# Patient Record
Sex: MALE | Race: BLACK OR AFRICAN AMERICAN | NOT HISPANIC OR LATINO | Employment: UNEMPLOYED | URBAN - METROPOLITAN AREA
[De-identification: names, ages, dates, MRNs, and addresses within clinical notes are randomized per-mention and may not be internally consistent; named-entity substitution may affect disease eponyms.]

---

## 2017-05-26 ENCOUNTER — HOSPITAL ENCOUNTER (INPATIENT)
Facility: HOSPITAL | Age: 22
LOS: 6 days | Discharge: HOME | DRG: 885 | End: 2017-06-01
Attending: PSYCHIATRY & NEUROLOGY | Admitting: PSYCHIATRY & NEUROLOGY
Payer: MEDICAID

## 2017-05-26 DIAGNOSIS — F63.81 INTERMITTENT EXPLOSIVE DISORDER: ICD-10-CM

## 2017-05-26 DIAGNOSIS — D50.9 MICROCYTIC ANEMIA: ICD-10-CM

## 2017-05-26 DIAGNOSIS — R45.850 HOMICIDAL THOUGHTS: ICD-10-CM

## 2017-05-26 DIAGNOSIS — F39 MOOD DISORDER: Primary | ICD-10-CM

## 2017-05-26 DIAGNOSIS — F17.210 CIGARETTE NICOTINE DEPENDENCE WITHOUT COMPLICATION: ICD-10-CM

## 2017-05-26 LAB
25(OH)D3+25(OH)D2 SERPL-MCNC: 20 NG/ML
CHOLEST/HDLC SERPL: 4.2 {RATIO}
FERRITIN SERPL-MCNC: 53 NG/ML
FOLATE SERPL-MCNC: 11.6 NG/ML
HDL/CHOLESTEROL RATIO: 23.9 %
HDLC SERPL-MCNC: 176 MG/DL
HDLC SERPL-MCNC: 42 MG/DL
IRON SERPL-MCNC: 41 UG/DL
LDLC SERPL CALC-MCNC: 114.8 MG/DL
NONHDLC SERPL-MCNC: 134 MG/DL
RPR SER QL: NORMAL
SATURATED IRON: 13 %
TOTAL IRON BINDING CAPACITY: 315 UG/DL
TRANSFERRIN SERPL-MCNC: 213 MG/DL
TRIGL SERPL-MCNC: 96 MG/DL
VIT B12 SERPL-MCNC: 791 PG/ML

## 2017-05-26 PROCEDURE — 82728 ASSAY OF FERRITIN: CPT

## 2017-05-26 PROCEDURE — 11400000 HC PSYCH PRIVATE ROOM

## 2017-05-26 PROCEDURE — 82746 ASSAY OF FOLIC ACID SERUM: CPT

## 2017-05-26 PROCEDURE — 84466 ASSAY OF TRANSFERRIN: CPT

## 2017-05-26 PROCEDURE — 82607 VITAMIN B-12: CPT

## 2017-05-26 PROCEDURE — 99223 1ST HOSP IP/OBS HIGH 75: CPT | Mod: AF,HB,, | Performed by: PSYCHIATRY & NEUROLOGY

## 2017-05-26 PROCEDURE — 82306 VITAMIN D 25 HYDROXY: CPT

## 2017-05-26 PROCEDURE — 97802 MEDICAL NUTRITION INDIV IN: CPT

## 2017-05-26 PROCEDURE — 83540 ASSAY OF IRON: CPT

## 2017-05-26 PROCEDURE — 80061 LIPID PANEL: CPT

## 2017-05-26 PROCEDURE — 90833 PSYTX W PT W E/M 30 MIN: CPT | Mod: AF,HB,, | Performed by: PSYCHIATRY & NEUROLOGY

## 2017-05-26 PROCEDURE — 99232 SBSQ HOSP IP/OBS MODERATE 35: CPT | Mod: ,,, | Performed by: NURSE PRACTITIONER

## 2017-05-26 PROCEDURE — 25000003 PHARM REV CODE 250: Performed by: PSYCHIATRY & NEUROLOGY

## 2017-05-26 PROCEDURE — 36415 COLL VENOUS BLD VENIPUNCTURE: CPT

## 2017-05-26 PROCEDURE — 27000339 *HC DAILY SUPPLY KIT

## 2017-05-26 PROCEDURE — 86592 SYPHILIS TEST NON-TREP QUAL: CPT

## 2017-05-26 RX ORDER — DIVALPROEX SODIUM 500 MG/1
500 TABLET, DELAYED RELEASE ORAL 2 TIMES DAILY
Status: DISCONTINUED | OUTPATIENT
Start: 2017-05-26 | End: 2017-06-01 | Stop reason: HOSPADM

## 2017-05-26 RX ORDER — ACETAMINOPHEN 325 MG/1
650 TABLET ORAL EVERY 6 HOURS PRN
Status: DISCONTINUED | OUTPATIENT
Start: 2017-05-26 | End: 2017-06-01 | Stop reason: HOSPADM

## 2017-05-26 RX ORDER — QUETIAPINE FUMARATE 25 MG/1
50 TABLET, FILM COATED ORAL NIGHTLY
Status: DISCONTINUED | OUTPATIENT
Start: 2017-05-26 | End: 2017-05-28

## 2017-05-26 RX ORDER — OLANZAPINE 10 MG/2ML
10 INJECTION, POWDER, FOR SOLUTION INTRAMUSCULAR EVERY 4 HOURS PRN
Status: DISCONTINUED | OUTPATIENT
Start: 2017-05-26 | End: 2017-06-01 | Stop reason: HOSPADM

## 2017-05-26 RX ORDER — IBUPROFEN 200 MG
1 TABLET ORAL DAILY PRN
Status: DISCONTINUED | OUTPATIENT
Start: 2017-05-26 | End: 2017-06-01 | Stop reason: HOSPADM

## 2017-05-26 RX ORDER — HYDROXYZINE PAMOATE 50 MG/1
50 CAPSULE ORAL EVERY 6 HOURS PRN
Status: DISCONTINUED | OUTPATIENT
Start: 2017-05-26 | End: 2017-06-01 | Stop reason: HOSPADM

## 2017-05-26 RX ORDER — OLANZAPINE 10 MG/1
10 TABLET ORAL EVERY 4 HOURS PRN
Status: DISCONTINUED | OUTPATIENT
Start: 2017-05-26 | End: 2017-06-01 | Stop reason: HOSPADM

## 2017-05-26 RX ORDER — MAG HYDROX/ALUMINUM HYD/SIMETH 200-200-20
30 SUSPENSION, ORAL (FINAL DOSE FORM) ORAL EVERY 6 HOURS PRN
Status: DISCONTINUED | OUTPATIENT
Start: 2017-05-26 | End: 2017-06-01 | Stop reason: HOSPADM

## 2017-05-26 RX ORDER — LOPERAMIDE HYDROCHLORIDE 2 MG/1
2 CAPSULE ORAL
Status: DISCONTINUED | OUTPATIENT
Start: 2017-05-26 | End: 2017-06-01 | Stop reason: HOSPADM

## 2017-05-26 RX ORDER — DOCUSATE SODIUM 100 MG/1
100 CAPSULE, LIQUID FILLED ORAL DAILY PRN
Status: DISCONTINUED | OUTPATIENT
Start: 2017-05-26 | End: 2017-06-01 | Stop reason: HOSPADM

## 2017-05-26 RX ADMIN — DIVALPROEX SODIUM 500 MG: 500 TABLET, DELAYED RELEASE ORAL at 09:05

## 2017-05-26 RX ADMIN — QUETIAPINE FUMARATE 50 MG: 25 TABLET, FILM COATED ORAL at 09:05

## 2017-05-26 RX ADMIN — THERA TABS 1 TABLET: TAB at 11:05

## 2017-05-26 RX ADMIN — HYDROXYZINE PAMOATE 50 MG: 50 CAPSULE ORAL at 10:05

## 2017-05-26 RX ADMIN — DIVALPROEX SODIUM 500 MG: 500 TABLET, DELAYED RELEASE ORAL at 11:05

## 2017-05-26 RX ADMIN — OLANZAPINE 10 MG: 10 TABLET, FILM COATED ORAL at 11:05

## 2017-05-26 NOTE — PLAN OF CARE
Problem: Patient Care Overview (Adult)  Goal: Plan of Care Review  Outcome: Ongoing (interventions implemented as appropriate)  Patient anxious and pacing most of shift. Cooperative and redirectable. Slow to comprehend and hard to understand due to stuttering. Staring often at peers and staff, mistrustful. Appetite good. Medication compliant. Hopeful to get into a group home upon discharge. Safety precautions maintained.
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
## 2017-05-26 NOTE — CONSULTS
[facility]  Adult Nutrition  Consult Note    SUMMARY     Recommendations    Recommendation/Intervention: 1. Cont w/ current diet order; order daily MVI  2. RD to cont to monitor all nutr parameters  Goals: Maintain meal intake >/=75%  Nutrition Goal Status: new       Reason for Assessment    Reason for Assessment: physician consult  Diagnosis: other (see comments) (20 yo male admitted w/ HI)  Relevent Medical History: ADHD, mood disorder, seizures         General Information Comments: Pt likely well-nourished pta; no nutr concerns at this time    Nutrition Discharge Planning: Home on Regular diet w/ adequate meal intake    Nutrition Prescription Ordered    Current Diet Order: Regular  Nutrition Order Comments: no meal intake documented as of yet           Nutrition Risk Screen     Nutrition Risk Screen: no indicators present    Nutrition/Diet History       Typical Food/Fluid Intake: Likely adequate pta  Food Preferences: JACKIE        Factors Affecting Nutritional Intake: other (see comments) (none @ this time)        Labs/Tests/Procedures/Meds       Pertinent Labs Reviewed: reviewed  Pertinent Labs Comments: CMP wnl  Pertinent Medications Reviewed: reviewed  Pertinent Medications Comments: only prn meds ordered thus far    Physical Findings    Overall Physical Appearance: obese        Skin: intact    Anthropometrics       Height (inches): 69.02 in     Weight (kg): 113.4 kg  Ideal Body Weight (IBW), Male: 160.12 lb     % Ideal Body Weight, Male (lb): 156.13 lb     BMI (kg/m2): 36.9  BMI Grade: 35 - 39.9 - obesity - grade II           Estimated/Assessed Needs    Weight Used For Calorie Calculations: 113.4 kg (250 lb)   Height (cm): 175.3 cm     Energy Need Method: [place]-St Jeor (= 2126 cals daily)      RMR (Otsego-St. Jeor Equation): 2125.25        Weight Used For Protein Calculations: 113.4 kg (250 lb)  Protein Requirements: 91 gms (.8 gms/kg)    Fluid Need Method: RDA Method (1ml/kcal or per  MD)            Assessment and Plan  No nutr dx @ this time.      Monitor and Evaluation    Food and Nutrient Intake: energy intake, food and beverage intake  Food and Nutrient Adminstration: diet order     Physical Activity and Function: nutrition-related ADLs and IADLs  Anthropometric Measurements: weight, weight change  Biochemical Data, Medical Tests and Procedures: electrolyte and renal panel, glucose/endocrine profile  Nutrition-Focused Physical Findings: overall appearance    Nutrition Risk    Level of Risk: other (see comments) (F/u 1 x weekly)    Nutrition Follow-Up    RD Follow-up?: Yes

## 2017-05-26 NOTE — PSYCH
"    Chief Complaint:  Patient states he had an argument with his cousin. "Yisela always with that mess, Another cousin brought me to her house ."   States there's a party today by keturah        Pt Age/Gender/Appearance/Psych History/Symptoms and Duration:  Patient is a 20yo male with a past psychiatric history of mood disorder, currently admitted to the inpatient unit with the following chief complaint: homicidal ideations    Suicidal Ideations/Plan/Attempt History/Risk and Protective Factors:  None    Substance Abuse History/UTOX:    Patient states he used to do drugs but is unclear what drugs.  States he does drug by himself, when asked where he got the drugs he states I don't know, I just did it. States he wants rehab.    Patient had a negative UTOX       Sleep/Appetite Quality:  When I get mad I dont eat       Compliance/Legal History/Issues:  None     Abuse Concerns:  None    Cultural/Sabianism Values/Beliefs:  none      Supports/Marital Status/Quality of Interpersonal Relationships:  Cousin       Initial Discharge Plan:      Possible group home in [facility]                                  "

## 2017-05-26 NOTE — HOSPITAL COURSE
Admitted to [facility]. Reports he just gets angry a lot. Feels like his body is breaking down on him.
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
## 2017-05-26 NOTE — HPI
"This is a 21 y.o. male with a Hx of mood disorder, who presents for psychiatric evaluation for social stressors. Pt states he received a phone call from his cousin stating that he was included in "some mess". The Pt notes that he is concerned about his well being being because he "does not have time for all that fighting". Pt denies any SI, HI, or audio/visual hallucinations. He admits to being compliant with his psychiatric medications. Pt has no other symptomatic complaints at time.   "

## 2017-05-26 NOTE — PLAN OF CARE
Problem: Patient Care Overview (Adult)  Goal: Plan of Care Review  Recommendations     Recommendation/Intervention: 1. Cont w/ current diet order; order daily MVI  2. RD to cont to monitor all nutr parameters  Goals: Maintain meal intake >/=75%  Nutrition Goal Status: new

## 2017-05-26 NOTE — SUBJECTIVE & OBJECTIVE
Past Medical History:   Diagnosis Date    ADHD (attention deficit hyperactivity disorder)     Bipolar disorder     History of psychiatric hospitalization     Hx of psychiatric care     Romi     Mood disorder     Psychiatric problem     Seizures     Therapy        No past surgical history on file.    Review of patient's allergies indicates:  No Known Allergies    No current facility-administered medications on file prior to encounter.      Current Outpatient Prescriptions on File Prior to Encounter   Medication Sig    quetiapine (SEROQUEL) 100 MG Tab Take by mouth.    divalproex (DEPAKOTE) 125 MG EC tablet Take 125 mg by mouth 3 (three) times daily.    permethrin (ELIMITE) 5 % cream Apply to affected area once     Family History     None        Social History Main Topics    Smoking status: Current Every Day Smoker     Packs/day: 1.00     Types: Cigarettes    Smokeless tobacco: Not on file      Comment: handout given and reviewed with pt, will have consult for smoking cessation    Alcohol use No    Drug use: No    Sexual activity: Not on file     Review of Systems   Constitutional: Negative for chills, diaphoresis, fatigue and fever.   Eyes: Negative for visual disturbance.   Respiratory: Negative for cough, shortness of breath and wheezing.    Cardiovascular: Negative for chest pain.   Gastrointestinal: Negative for abdominal distention, abdominal pain, constipation, diarrhea, nausea and vomiting.   Musculoskeletal: Negative for arthralgias, back pain and myalgias.   Neurological: Negative for headaches.   Psychiatric/Behavioral: Negative for agitation, dysphoric mood, self-injury, sleep disturbance and suicidal ideas. The patient is not nervous/anxious and is not hyperactive.         + anger issues     Objective:     Vital Signs (Most Recent):  Temp: (!) 95.7 °F (35.4 °C) (05/26/17 0800)  Pulse: 64 (05/26/17 0800)  Resp: 16 (05/26/17 0800)  BP: (!) 124/58 (05/26/17 0800) Vital Signs (24h  Range):  Temp:  [95.7 °F (35.4 °C)-98.2 °F (36.8 °C)] 95.7 °F (35.4 °C)  Pulse:  [64-77] 64  Resp:  [14-16] 16  SpO2:  [99 %] 99 %  BP: (124-158)/(58-70) 124/58     Weight: 113.4 kg (250 lb)  Body mass index is 36.92 kg/m².    Physical Exam   Constitutional: He is oriented to person, place, and time. He appears well-developed and well-nourished.   HENT:   Head: Normocephalic and atraumatic.   Right Ear: External ear normal.   Left Ear: External ear normal.   Nose: Nose normal.   Mouth/Throat: Oropharynx is clear and moist. No oropharyngeal exudate.   Cardiovascular: Normal rate, regular rhythm and normal heart sounds.    Pulmonary/Chest: Effort normal and breath sounds normal.   Abdominal: Soft. Bowel sounds are normal.   Musculoskeletal: He exhibits no edema.   Neurological: He is alert and oriented to person, place, and time. No cranial nerve deficit.   Neuro: Cranial nerves:  CN II Visual fields full to confrontation.   CN III, IV, VI Pupils are equal, round, and reactive to light.  CN III: no palsy  Nystagmus: none   Diplopia: none  Ophthalmoparesis: none  CN V Facial sensation intact.    CN VII Facial expression full, symmetric.    CN VIII normal.    CN IX normal.   CN X normal.   CN XI normal.   CN XII normal.       Skin: Skin is warm and dry. No pallor.   Psychiatric: He has a normal mood and affect. His behavior is normal. Judgment and thought content normal.   Nursing note and vitals reviewed.    Significant Labs:   CBC:   Recent Labs  Lab 05/26/17  0002   WBC 6.52   HGB 12.6*   HCT 39.5*        CMP:   Recent Labs  Lab 05/26/17  0002      K 3.8      CO2 26   GLU 97   BUN 15   CREATININE 1.2   CALCIUM 9.1   PROT 7.5   ALBUMIN 3.8   BILITOT 0.5   ALKPHOS 58   AST 27   ALT 24   ANIONGAP 9   EGFRNONAA >60.0     Lipid Panel:   Recent Labs  Lab 05/26/17  0557   CHOL 176   HDL 42   LDLCALC 114.8   TRIG 96   CHOLHDL 23.9     TSH:   Recent Labs  Lab 05/26/17  0002   TSH 3.170     Urine Studies:    Recent Labs  Lab 05/26/17  0102   COLORU Yellow   APPEARANCEUA Clear   PHUR 5.0   SPECGRAV 1.030   PROTEINUA 1+*   GLUCUA Negative   KETONESU Trace*   BILIRUBINUA Negative   OCCULTUA Negative   NITRITE Negative   UROBILINOGEN 4.0-6.0*   LEUKOCYTESUR Negative   RBCUA 1   WBCUA 1   BACTERIA None   SQUAMEPITHEL 0   HYALINECASTS 0       Significant Imaging: none

## 2017-05-26 NOTE — CONSULTS
"[facility] Hospital Medicine  Consult Note    Patient Name: Rosalinda Terrell  MRN: [id number]  Admission Date: 5/26/2017  Hospital Length of Stay: 0 days  Attending Physician: Kameron Freeman MD   Primary Care Provider: Primary Doctor No           Patient information was obtained from patient and ER records.     Consults  Subjective:     Principal Problem: Mood disorder    Chief Complaint: No chief complaint on file.       HPI: This is a 21 y.o. male with a Hx of mood disorder, who presents for psychiatric evaluation for social stressors. Pt states he received a phone call from his cousin stating that he was included in "some mess". The Pt notes that he is concerned about his well being being because he "does not have time for all that fighting". Pt denies any SI, HI, or audio/visual hallucinations. He admits to being compliant with his psychiatric medications. Pt has no other symptomatic complaints at time.     Past Medical History:   Diagnosis Date    ADHD (attention deficit hyperactivity disorder)     Bipolar disorder     History of psychiatric hospitalization     Hx of psychiatric care     Romi     Mood disorder     Psychiatric problem     Seizures     Therapy        No past surgical history on file.    Review of patient's allergies indicates:  No Known Allergies    No current facility-administered medications on file prior to encounter.      Current Outpatient Prescriptions on File Prior to Encounter   Medication Sig    quetiapine (SEROQUEL) 100 MG Tab Take by mouth.    divalproex (DEPAKOTE) 125 MG EC tablet Take 125 mg by mouth 3 (three) times daily.    permethrin (ELIMITE) 5 % cream Apply to affected area once     Family History     None        Social History Main Topics    Smoking status: Current Every Day Smoker     Packs/day: 1.00     Types: Cigarettes    Smokeless tobacco: Not on file      Comment: handout given and reviewed with pt, will have consult for smoking " cessation    Alcohol use No    Drug use: No    Sexual activity: Not on file     Review of Systems   Constitutional: Negative for chills, diaphoresis, fatigue and fever.   Eyes: Negative for visual disturbance.   Respiratory: Negative for cough, shortness of breath and wheezing.    Cardiovascular: Negative for chest pain.   Gastrointestinal: Negative for abdominal distention, abdominal pain, constipation, diarrhea, nausea and vomiting.   Musculoskeletal: Negative for arthralgias, back pain and myalgias.   Neurological: Negative for headaches.   Psychiatric/Behavioral: Negative for agitation, dysphoric mood, self-injury, sleep disturbance and suicidal ideas. The patient is not nervous/anxious and is not hyperactive.         + anger issues     Objective:     Vital Signs (Most Recent):  Temp: (!) 95.7 °F (35.4 °C) (05/26/17 0800)  Pulse: 64 (05/26/17 0800)  Resp: 16 (05/26/17 0800)  BP: (!) 124/58 (05/26/17 0800) Vital Signs (24h Range):  Temp:  [95.7 °F (35.4 °C)-98.2 °F (36.8 °C)] 95.7 °F (35.4 °C)  Pulse:  [64-77] 64  Resp:  [14-16] 16  SpO2:  [99 %] 99 %  BP: (124-158)/(58-70) 124/58     Weight: 113.4 kg (250 lb)  Body mass index is 36.92 kg/m².    Physical Exam   Constitutional: He is oriented to person, place, and time. He appears well-developed and well-nourished.   HENT:   Head: Normocephalic and atraumatic.   Right Ear: External ear normal.   Left Ear: External ear normal.   Nose: Nose normal.   Mouth/Throat: Oropharynx is clear and moist. No oropharyngeal exudate.   Cardiovascular: Normal rate, regular rhythm and normal heart sounds.    Pulmonary/Chest: Effort normal and breath sounds normal.   Abdominal: Soft. Bowel sounds are normal.   Musculoskeletal: He exhibits no edema.   Neurological: He is alert and oriented to person, place, and time. No cranial nerve deficit.   Neuro: Cranial nerves:  CN II Visual fields full to confrontation.   CN III, IV, VI Pupils are equal, round, and reactive to light.  CN  III: no palsy  Nystagmus: none   Diplopia: none  Ophthalmoparesis: none  CN V Facial sensation intact.    CN VII Facial expression full, symmetric.    CN VIII normal.    CN IX normal.   CN X normal.   CN XI normal.   CN XII normal.       Skin: Skin is warm and dry. No pallor.   Psychiatric: He has a normal mood and affect. His behavior is normal. Judgment and thought content normal.   Nursing note and vitals reviewed.    Significant Labs:   CBC:   Recent Labs  Lab 05/26/17  0002   WBC 6.52   HGB 12.6*   HCT 39.5*        CMP:   Recent Labs  Lab 05/26/17  0002      K 3.8      CO2 26   GLU 97   BUN 15   CREATININE 1.2   CALCIUM 9.1   PROT 7.5   ALBUMIN 3.8   BILITOT 0.5   ALKPHOS 58   AST 27   ALT 24   ANIONGAP 9   EGFRNONAA >60.0     Lipid Panel:   Recent Labs  Lab 05/26/17  0557   CHOL 176   HDL 42   LDLCALC 114.8   TRIG 96   CHOLHDL 23.9     TSH:   Recent Labs  Lab 05/26/17  0002   TSH 3.170     Urine Studies:   Recent Labs  Lab 05/26/17  0102   COLORU Yellow   APPEARANCEUA Clear   PHUR 5.0   SPECGRAV 1.030   PROTEINUA 1+*   GLUCUA Negative   KETONESU Trace*   BILIRUBINUA Negative   OCCULTUA Negative   NITRITE Negative   UROBILINOGEN 4.0-6.0*   LEUKOCYTESUR Negative   RBCUA 1   WBCUA 1   BACTERIA None   SQUAMEPITHEL 0   HYALINECASTS 0       Significant Imaging: none    Assessment/Plan:     Microcytic anemia    Anemia w/up ordered per Dr. Freeman.           Cigarette nicotine dependence without complication              Intermittent explosive disorder              Homicidal thoughts              * Mood disorder                VTE Risk Mitigation     None        Thank you for your consult. I will sign off. Please contact us if you have any additional questions.    Isabel Crawford NP  Department of Hospital Medicine   [facility]

## 2017-05-27 PROCEDURE — 11400000 HC PSYCH PRIVATE ROOM

## 2017-05-27 PROCEDURE — 99231 SBSQ HOSP IP/OBS SF/LOW 25: CPT | Mod: HB,AF,S$PBB, | Performed by: PSYCHIATRY & NEUROLOGY

## 2017-05-27 PROCEDURE — 25000003 PHARM REV CODE 250: Performed by: PSYCHIATRY & NEUROLOGY

## 2017-05-27 PROCEDURE — 27000339 *HC DAILY SUPPLY KIT

## 2017-05-27 RX ADMIN — ACETAMINOPHEN 650 MG: 325 TABLET, FILM COATED ORAL at 08:05

## 2017-05-27 RX ADMIN — DIVALPROEX SODIUM 500 MG: 500 TABLET, DELAYED RELEASE ORAL at 08:05

## 2017-05-27 RX ADMIN — QUETIAPINE FUMARATE 50 MG: 25 TABLET, FILM COATED ORAL at 08:05

## 2017-05-27 RX ADMIN — THERA TABS 1 TABLET: TAB at 09:05

## 2017-05-27 RX ADMIN — DIVALPROEX SODIUM 500 MG: 500 TABLET, DELAYED RELEASE ORAL at 09:05

## 2017-05-27 NOTE — PLAN OF CARE
Problem: Patient Care Overview (Adult)  Goal: Plan of Care Review  Outcome: Ongoing (interventions implemented as appropriate)  Shift note : patient is in the day room with staff and peers . He is interacting with them. He is coloring pictures and taking all ordered medications , he is eating all meals .

## 2017-05-27 NOTE — PLAN OF CARE
Problem: Patient Care Overview (Adult)  Goal: Individualization & Mutuality  Patient calm and cooperative with staff.  Out of room for PM snack and took medication with no problem.  He denies SI and HI or plan, but would not express feelings.

## 2017-05-27 NOTE — PROGRESS NOTES
"PSYCHIATRY DAILY INPATIENT PROGRESS NOTE  SUBSEQUENT HOSPITAL VISIT    ENCOUNTER DATE: 5/27/2017  SITE: [facility]    DATE OF ADMISSION: 5/26/2017  3:58 AM  LENGTH OF STAY: 1 days      HISTORY    CHIEF COMPLAINT   Rosalinda Terrell is a 21 y.o. male, seen during daily koroma rounds on the inpatient unit.  Rosalinda Terrell presents with the chief complaint of homicidal ideations.    HPI   (Elements: Location, Quality, Severity, Duration, Timing, Content, Modifying Factors, Associated Signs & Symptoms)    The patient was seen and examined. The chart was reviewed.    Staff reports no behavioral or management issues. Pacing on the unit yesterday with anxious affect.    The patient has been compliant with treatment. The patient denied any side effects.     On my interview, patient is calm and cooperative.  Poverty of speech, limited engagement in assessment.  Remains a poor historian, and I had difficulty following patient's thought process.  He perseverates on going to a "group home" on discharge.  Reports mood is "good" and that he's "anxious to be better" and "go to a group home."  Reports that he's getting along with everyone on the unit.    Denies psychiatric symptoms as below:  Denied Symptoms of Depression: no diminished mood or loss of interest/anhedonia; no irritability, diminished energy, change in sleep, change in appetite, diminished concentration or cognition or indecisiveness, PMA/R, excessive guilt or hopelessness or worthlessness, or suicidal ideations     Denied changes in Sleep: no trouble with initiation, maintenanc, or early morning awakening with inability to return to sleep     Denied Suicidal/Homicidal ideations: no active/passive ideations, organized plans,or  future intentions     Denied Symptoms of psychosis: no hallucinations, delusions, disorganized thinking, disorganized behavior or abnormal motor behavior, or negative symptoms     Denied Symptoms of temitope or hypomania: no " elevated, expansive, or irritable mood with no increased energy or activity; with no inflated self-esteem or grandiosity, decreased need for sleep, increased rate of speech, FOI or racing thoughts, distractibility, increased goal directed activity or PMA, or risky/disinhibited behavior     Denied Symptoms of HUGO: no excessive anxiety/worry/fear; with no restlessness, fatigue, poor concentration, irritability, muscle tension, or sleep disturbance     Denied Symptoms of Panic Disorder: no recurrent panic attacks; without agoraphobia     Denied Symptoms of PTSD: no h/o trauma; no re-experiencing/intrusive symptoms, avoidant behavior, negative alterations in cognition or mood, or hyperarousal symptoms; without dissociative symptoms      Denied Symptoms of OCD: no obsessions or compulsions      Denied Symptoms of Eating Disorders: no anorexia, bulimia or binging     Denied Substance Use: no intoxication, withdrawal, tolerance, used in larger amounts or duration than intended, unsuccessful attempts to limit or quit, increased time engaging in or seeking out, cravings or strong desire to use, failure to fulfill obligations, negative consequences in social/interpersonal/occupational,/recreational areas, use in dangerous situations, or medical or psychological consequences       ROS  General ROS: negative  Ophthalmic ROS: negative  ENT ROS: negative  Allergy and Immunology ROS: negative  Hematological and Lymphatic ROS: negative  Endocrine ROS: negative  Respiratory ROS: no cough, shortness of breath, or wheezing  Cardiovascular ROS: no chest pain or dyspnea on exertion  Gastrointestinal ROS: no abdominal pain, change in bowel habits, or black or bloody stools  Genito-Urinary ROS: no dysuria, trouble voiding, or hematuria  Musculoskeletal ROS: negative  Neurological ROS: no TIA or stroke symptoms  Dermatological ROS: negative    PAST MEDICAL HISTORY   Past Medical History:   Diagnosis Date    ADHD (attention deficit  "hyperactivity disorder)     Bipolar disorder     History of psychiatric hospitalization     Hx of psychiatric care     Romi     Mood disorder     Psychiatric problem     Seizures     Therapy            PSYCHOTROPIC MEDICATIONS   Scheduled Meds:   divalproex  500 mg Oral BID    multivitamin  1 tablet Oral Daily    quetiapine  50 mg Oral QHS     Continuous Infusions:   PRN Meds:.acetaminophen, aluminum-magnesium hydroxide-simethicone, docusate sodium, hydrOXYzine pamoate, loperamide, nicotine, olanzapine **AND** olanzapine, pneumoc 13-donnell conj-dip cr(PF)        EXAMINATION    VITALS   Vitals:    05/27/17 0800   BP: 130/84   Pulse: 62   Resp: 18   Temp: (!) 95.7 °F (35.4 °C)       CONSTITUTIONAL  General Appearance: stated age, casual dress    NEUROLOGIC  Abnormal Involuntary Movements: none noted  Gait and Station: normal gate    PSYCHIATRIC   Level of Consciousness: alert, awake  Orientation: grossly oriented  Grooming: appropriate for setting  Psychomotor Behavior: no PMR/PMA  Speech: mumbled at times  Mood: "anxious and good"  Affect: congruent, somewhat constricted  Thought Process: somewhat tangential  Thought Content: no SI/HI, no AH/VH  Memory: grossly intact but limited, likely by cognition  Attention: intact to conversation  Estimate if Intelligence:  Below to low-average based on work/education history, vocabulary and mental status exam  Insight: limited- limited understanding of anger issues and tx needs/plan  Judgment: fair- no bx issues, compliant and cooperative; reports of recent violnece        DIAGNOSTIC TESTING   Laboratory Results  No results found for this or any previous visit (from the past 24 hour(s)).      MEDICAL DECISION MAKING    DIAGNOSES  Unspecified mood Disorder  Intermittent Explosive Disorder     Nicotine Dependence      Microcytic anemia  H/o seizure disorder    PROBLEM LIST AND MANAGEMENT PLANS  Mood  IED  Nicotine use  Anemia  H/o seizure disorder    PRESCRIPTION DRUG " MANAGEMENT  Compliance: yes  Side Effects: no  Regimen Adjustments:   Depakote 500 mg po BID for mood stabilization, off-label IED, and seizure ppx,   Seroquel at 50 mg po q HS for adjunctive mood disorder     Nicotine patch daily for nicotine cessation; patient counseled         DISCHARGE PLANNING  -SW to assist with aftercare planning and collateral  -Once stable, discharge to group home with outpatient follow up care and/or rehab  -Continue inpatient treatment under a PEC and/or CEC for danger to others as evidenced by HI in context of impulsivity, problems with mood, and suspected cognitive impairment.  Patient with limited insight and poor judgement.    NEED FOR CONTINUED HOSPITALIZATION  Psychiatric illness continues to pose a potential threat to life or bodily function, of self or others, thereby requiring the need for continued inpatient psychiatric hospitalization: Yes    Protective inpatient pyschiatric hospitalization required while a safe disposition plan is enacted: Yes    Patient stabilized and ready for discharge from inpatient psychiatric unit: No      STAFF:   Lupillo Angela MD  Psychiatry

## 2017-05-27 NOTE — NURSING
Patient resting quietly in bed with eyes closed.  Respirations easy and unlabored appears asleep.  Slept well all shift.  Safety maintained with rounds every 15 minutes. Bed at lowest position.  Path ways kept clear.  No fall occured .

## 2017-05-28 PROCEDURE — 27000339 *HC DAILY SUPPLY KIT

## 2017-05-28 PROCEDURE — 25000003 PHARM REV CODE 250: Performed by: PSYCHIATRY & NEUROLOGY

## 2017-05-28 PROCEDURE — 11400000 HC PSYCH PRIVATE ROOM

## 2017-05-28 PROCEDURE — 99231 SBSQ HOSP IP/OBS SF/LOW 25: CPT | Mod: HB,AF,S$PBB, | Performed by: PSYCHIATRY & NEUROLOGY

## 2017-05-28 RX ORDER — QUETIAPINE FUMARATE 100 MG/1
100 TABLET, FILM COATED ORAL NIGHTLY
Status: DISCONTINUED | OUTPATIENT
Start: 2017-05-28 | End: 2017-06-01 | Stop reason: HOSPADM

## 2017-05-28 RX ADMIN — QUETIAPINE FUMARATE 100 MG: 100 TABLET, FILM COATED ORAL at 09:05

## 2017-05-28 RX ADMIN — THERA TABS 1 TABLET: TAB at 08:05

## 2017-05-28 RX ADMIN — DIVALPROEX SODIUM 500 MG: 500 TABLET, DELAYED RELEASE ORAL at 09:05

## 2017-05-28 RX ADMIN — DIVALPROEX SODIUM 500 MG: 500 TABLET, DELAYED RELEASE ORAL at 08:05

## 2017-05-28 NOTE — PLAN OF CARE
Problem: Patient Care Overview (Adult)  Goal: Individualization & Mutuality  Patient calm and cooperative with staff.  He took PM medications with no problem.  Tylenol was given for c/o right thumb pain.  He stated that he believed the pain is from coloring too much, but he will still color because he likes it.  Rosalinda admits to being a little depressed but not SI and HI or plan.

## 2017-05-28 NOTE — PLAN OF CARE
Problem: Patient Care Overview (Adult)  Goal: Plan of Care Review  Outcome: Ongoing (interventions implemented as appropriate)  Shift note : patient is in the day room with staff and peers . He states that his goal is to be on his medications so he will stop hanging with the wrong crowd and not go back to residential . He is in pleasant mood and is talking with peers and staff . He is taking all ordered medications and eating all meals .

## 2017-05-29 LAB
ALBUMIN SERPL BCP-MCNC: 3.2 G/DL
ALP SERPL-CCNC: 48 U/L
ALT SERPL W/O P-5'-P-CCNC: 18 U/L
ANION GAP SERPL CALC-SCNC: 9 MMOL/L
AST SERPL-CCNC: 15 U/L
BILIRUB SERPL-MCNC: 0.2 MG/DL
BUN SERPL-MCNC: 8 MG/DL
CALCIUM SERPL-MCNC: 9.3 MG/DL
CHLORIDE SERPL-SCNC: 103 MMOL/L
CO2 SERPL-SCNC: 28 MMOL/L
CREAT SERPL-MCNC: 1 MG/DL
EST. GFR  (AFRICAN AMERICAN): >60 ML/MIN/1.73 M^2
EST. GFR  (NON AFRICAN AMERICAN): >60 ML/MIN/1.73 M^2
GLUCOSE SERPL-MCNC: 79 MG/DL
POTASSIUM SERPL-SCNC: 4.2 MMOL/L
PROT SERPL-MCNC: 7.1 G/DL
SODIUM SERPL-SCNC: 140 MMOL/L
VALPROATE SERPL-MCNC: 62.2 UG/ML

## 2017-05-29 PROCEDURE — 36415 COLL VENOUS BLD VENIPUNCTURE: CPT

## 2017-05-29 PROCEDURE — 11400000 HC PSYCH PRIVATE ROOM

## 2017-05-29 PROCEDURE — 27000339 *HC DAILY SUPPLY KIT

## 2017-05-29 PROCEDURE — 99233 SBSQ HOSP IP/OBS HIGH 50: CPT | Mod: HB,AF,S$PBB, | Performed by: PSYCHIATRY & NEUROLOGY

## 2017-05-29 PROCEDURE — 80164 ASSAY DIPROPYLACETIC ACD TOT: CPT

## 2017-05-29 PROCEDURE — 25000003 PHARM REV CODE 250: Performed by: PSYCHIATRY & NEUROLOGY

## 2017-05-29 PROCEDURE — 80053 COMPREHEN METABOLIC PANEL: CPT

## 2017-05-29 RX ORDER — ASPIRIN 325 MG
50000 TABLET, DELAYED RELEASE (ENTERIC COATED) ORAL
Status: DISCONTINUED | OUTPATIENT
Start: 2017-05-29 | End: 2017-06-01 | Stop reason: HOSPADM

## 2017-05-29 RX ADMIN — QUETIAPINE FUMARATE 100 MG: 100 TABLET, FILM COATED ORAL at 08:05

## 2017-05-29 RX ADMIN — OFLOXACIN 50000 UNITS: 300 TABLET, COATED ORAL at 12:05

## 2017-05-29 RX ADMIN — DIVALPROEX SODIUM 500 MG: 500 TABLET, DELAYED RELEASE ORAL at 08:05

## 2017-05-29 RX ADMIN — THERA TABS 1 TABLET: TAB at 08:05

## 2017-05-29 NOTE — PLAN OF CARE
Problem: Overarching Goals (Adult)  Goal: Develops/Participates in Therapeutic [place] to Support Successful Transition  Group Note    Behavior:  Patient attended Psychotherapy Group and participated. Patient presents with calm mood and affect.     Intervention:  Supportive Relationships - processed with group healthy relationships and the importance to recovery. Patients identified attributes of supportive relationships and completed a support map. Patient discussed their supportive relationships and what they are doing to maintain or improve the quality of those relationships and build new ones.       Response:  Patient states just Sunday he renewed his relationship with his mother and grandmother and they now have trust in him. Patient notes only some of his cousins are supportive. Patient notes that he can work on improving his communication skills.     Plan:  Will continue to encourage Patient participation in group.
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
## 2017-05-29 NOTE — PLAN OF CARE
Problem: Patient Care Overview (Adult)  Goal: Plan of Care Review  Outcome: Ongoing (interventions implemented as appropriate)  Patient calm and cooperative. Mood improved. Wants to get into a group home and get a job. Interacts well with peers. In day room all of shift, coloring. Walks halls with another peer. Medication compliant. Appetite good. Spoke on phone with mother, conversation went well.

## 2017-05-29 NOTE — PLAN OF CARE
Problem: Patient Care Overview (Adult)  Goal: Individualization & Mutuality  Patient is calm and cooperative with staff.  He took PM medication with no problem.  Rosalinda states that he will be getting a job at [employer] when he is discharge.  Patient denies SI and HI or plan.
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
## 2017-05-30 PROCEDURE — 11400000 HC PSYCH PRIVATE ROOM

## 2017-05-30 PROCEDURE — 99233 SBSQ HOSP IP/OBS HIGH 50: CPT | Mod: HB,AF,S$PBB, | Performed by: PSYCHIATRY & NEUROLOGY

## 2017-05-30 PROCEDURE — 25000003 PHARM REV CODE 250: Performed by: PSYCHIATRY & NEUROLOGY

## 2017-05-30 PROCEDURE — 27000339 *HC DAILY SUPPLY KIT

## 2017-05-30 RX ADMIN — DIVALPROEX SODIUM 500 MG: 500 TABLET, DELAYED RELEASE ORAL at 08:05

## 2017-05-30 RX ADMIN — THERA TABS 1 TABLET: TAB at 08:05

## 2017-05-30 RX ADMIN — QUETIAPINE FUMARATE 100 MG: 100 TABLET, FILM COATED ORAL at 08:05

## 2017-05-30 NOTE — PROGRESS NOTES
PSYCHIATRY DAILY INPATIENT PROGRESS NOTE  SUBSEQUENT HOSPITAL VISIT    ENCOUNTER DATE: 5/30/2017  [facility]    DATE OF ADMISSION: 5/26/2017  3:58 AM  LENGTH OF STAY: 4 days      HISTORY    CHIEF COMPLAINT   Rosalinda Terrell is a 21 y.o. male, seen during daily koroma rounds on the inpatient unit.  Rosalinda Terrell presents with the chief complaint of homicidal ideations.    HPI   (Elements: Location, Quality, Severity, Duration, Timing, Content, Modifying Factors, Associated Signs & Symptoms)    The patient was seen and examined. The chart was reviewed.      Staff reports no behavioral or management issues. Patient interacting appropriately in milieu.     The patient has been compliant with treatment. The patient denied any side effects.     The patient continues to deny all psychiatric symptoms as documented below. He reports that he wants to go to a group home and non longer live with his family. He continues to oddly relate with staff and peers. He is less irritable and more interactive. He is better caring for his ADLs.     Denies psychiatric symptoms as below:    Denied Symptoms of Depression: no diminished mood or loss of interest/anhedonia; no irritability, diminished energy, change in sleep, change in appetite, diminished concentration or cognition or indecisiveness, PMA/R, excessive guilt or hopelessness or worthlessness, or suicidal ideations     Denied changes in Sleep: no trouble with initiation, maintenanc, or early morning awakening with inability to return to sleep     Denied Suicidal/Homicidal ideations: no active/passive ideations, organized plans,or  future intentions     Denied Symptoms of psychosis: no hallucinations, delusions, disorganized thinking, disorganized behavior or abnormal motor behavior, or negative symptoms     Denied Symptoms of temitope or hypomania: no elevated, expansive, or irritable mood with no increased energy or activity; with no inflated self-esteem or  grandiosity, decreased need for sleep, increased rate of speech, FOI or racing thoughts, distractibility, increased goal directed activity or PMA, or risky/disinhibited behavior     Denied Symptoms of Anxiety: no excessive anxiety/worry/fear; no panic attacks.       ROS  General ROS: negative  Ophthalmic ROS: negative  ENT ROS: negative  Allergy and Immunology ROS: negative  Hematological and Lymphatic ROS: negative  Endocrine ROS: negative  Respiratory ROS: no cough, shortness of breath, or wheezing  Cardiovascular ROS: no chest pain or dyspnea on exertion  Gastrointestinal ROS: no abdominal pain, change in bowel habits, or black or bloody stools  Genito-Urinary ROS: no dysuria, trouble voiding, or hematuria  Musculoskeletal ROS: negative  Neurological ROS: no TIA or stroke symptoms  Dermatological ROS: negative    PAST MEDICAL HISTORY   Past Medical History:   Diagnosis Date    ADHD (attention deficit hyperactivity disorder)     Bipolar disorder     History of psychiatric hospitalization     Hx of psychiatric care     Romi     Mood disorder     Psychiatric problem     Seizures     Therapy            PSYCHOTROPIC MEDICATIONS   Scheduled Meds:   cholecalciferol (vitamin D3)  50,000 Units Oral Q7 Days    divalproex  500 mg Oral BID    multivitamin  1 tablet Oral Daily    quetiapine  100 mg Oral QHS     Continuous Infusions:   PRN Meds:.acetaminophen, aluminum-magnesium hydroxide-simethicone, docusate sodium, hydrOXYzine pamoate, loperamide, nicotine, olanzapine **AND** olanzapine, pneumoc 13-donnell conj-dip cr(PF)        EXAMINATION    VITALS   Vitals:    05/30/17 0838   BP: (!) 122/58   Pulse: 63   Resp: 20   Temp: 96.2 °F (35.7 °C)       CONSTITUTIONAL  General Appearance: stated age, casual dress    NEUROLOGIC  Abnormal Involuntary Movements: none noted  Gait and Station: normal gate    PSYCHIATRIC   Level of Consciousness: alert, awake  Orientation: grossly oriented  Grooming: appropriate for  "setting  Psychomotor Behavior: no PMR/PMA  Speech: mumbled at times  Mood: "ok"  Affect: congruent, constricted  Thought Process: somewhat tangential  Thought Content: no SI/HI, no AH/VH  Memory: grossly intact but limited, likely by cognition  Attention: intact to conversation  Estimate if Intelligence:  Below to low-average based on work/education history, vocabulary and mental status exam  Insight: limited- limited understanding of anger issues and tx needs/plan  Judgment: fair- no bx issues, compliant and cooperative; reports of recent violnece        DIAGNOSTIC TESTING   Laboratory Results  No results found for this or any previous visit (from the past 24 hour(s)).      MEDICAL DECISION MAKING    DIAGNOSES  Unspecified mood Disorder  Intermittent Explosive Disorder     Nicotine Dependence      Microcytic anemia  Vitamin D insufficiency  H/o seizure disorder    PROBLEM LIST AND MANAGEMENT PLANS  Mood  IED  Nicotine use  Anemia  H/o seizure disorder  Vitamin D insufficiency    PRESCRIPTION DRUG MANAGEMENT  Compliance: yes  Side Effects: no  Regimen Adjustments:   -Depakote 500 mg po BID for mood stabilization, off-label IED, and seizure ppx, pending VPA lvl Monday AM  -Seroquel 100 po q HS for adjunctive mood disorder  -Vitamin D3 50,000 units po q week x 8 weeks (1/8 completed) for Vitamin D insufficiency      Nicotine patch daily for nicotine cessation; patient counseled    Microcytic anemia- f/u with PCP     Labs:  Labs reviewed    DISCHARGE PLANNING  -SW to assist with aftercare planning and collateral  -Once stable, discharge to group home with outpatient follow up care and/or rehab  -Continue inpatient treatment under a PEC and/or CEC for danger to others as evidenced by HI in context of impulsivity, problems with mood, and suspected cognitive impairment.  Patient with limited insight and poor judgement. Not attending to ADL's. Patient is improving and will neil be stable for discharge Wednesday " (tomorrow).     NEED FOR CONTINUED HOSPITALIZATION  Psychiatric illness continues to pose a potential threat to life or bodily function, of self or others, thereby requiring the need for continued inpatient psychiatric hospitalization: Yes    Protective inpatient pyschiatric hospitalization required while a safe disposition plan is enacted: Yes    Patient stabilized and ready for discharge from inpatient psychiatric unit: No      STAFF:   Kameron Freeman MD  Psychiatry

## 2017-05-30 NOTE — PLAN OF CARE
Problem: Patient Care Overview (Adult)  Goal: Plan of Care Review  Outcome: Ongoing (interventions implemented as appropriate)  Visible in the milieu.  Spending his time in the dayroom watching TV.  Calm, quiet, cooperative.  Compliant with unit rules and meds.  No complaints.  Agreeable with plan of care.  Showered this evening.  Fair interaction with peers.  Pronounced stutter noted.  Safety checks ongoing.

## 2017-05-30 NOTE — PLAN OF CARE
Problem: Patient Care Overview (Adult)  Goal: Plan of Care Review  Outcome: Ongoing (interventions implemented as appropriate)  Patient cooperative. Interacting well with staff and peers. No outbursts. Appetite good. Medication compliant. Safety precautions maintained.

## 2017-05-30 NOTE — PLAN OF CARE
Problem: Overarching Goals (Adult)  Goal: Develops/Participates in Therapeutic [place] to Support Successful Transition  Group Note    Behavior:  Patient attended Psychotherapy Group and participated.   Patient presents with calm mood and affect.     Intervention:  Self portrait - patient's ken[place] their self image or view of themselves. Discussed how a positive self image can enhance physical mental, social, emotional and spiritual well being and how a negative self image can decrease satisfaction and ability to function. Patients also listed positive characteristics about themselves.      Response:  Patient at first reluctant to draw, but eventually did. Patient notes he is kind hearted, generous, likes cartoons, football and his mom.     Plan:  Will continue to encourage patient participation in group.

## 2017-05-30 NOTE — PLAN OF CARE
Problem: Patient Care Overview (Adult)  Goal: Plan of Care Review  Outcome: Ongoing (interventions implemented as appropriate)  Pt is awake at this time and has slept 6 hours so far.  NAD.  Resp even & unlabored.  Pathways clear and bed is low.  Q 15 minute safety checks ongoing.  All precautions maintained.

## 2017-05-31 PROCEDURE — 25000003 PHARM REV CODE 250: Performed by: PSYCHIATRY & NEUROLOGY

## 2017-05-31 PROCEDURE — 99233 SBSQ HOSP IP/OBS HIGH 50: CPT | Mod: HB,AF,S$PBB, | Performed by: PSYCHIATRY & NEUROLOGY

## 2017-05-31 PROCEDURE — 27000339 *HC DAILY SUPPLY KIT

## 2017-05-31 PROCEDURE — 11400000 HC PSYCH PRIVATE ROOM

## 2017-05-31 RX ADMIN — DIVALPROEX SODIUM 500 MG: 500 TABLET, DELAYED RELEASE ORAL at 08:05

## 2017-05-31 RX ADMIN — THERA TABS 1 TABLET: TAB at 08:05

## 2017-05-31 RX ADMIN — QUETIAPINE FUMARATE 100 MG: 100 TABLET, FILM COATED ORAL at 08:05

## 2017-05-31 NOTE — PROGRESS NOTES
PSYCHIATRY DAILY INPATIENT PROGRESS NOTE  SUBSEQUENT HOSPITAL VISIT    ENCOUNTER DATE: 5/31/2017  [facility]    DATE OF ADMISSION: 5/26/2017  3:58 AM  LENGTH OF STAY: 5 days      HISTORY    CHIEF COMPLAINT   Rosalinda Terrell is a 21 y.o. male, seen during daily koroma rounds on the inpatient unit.  Rosalinda Terrell presents with the chief complaint of homicidal ideations.    HPI   (Elements: Location, Quality, Severity, Duration, Timing, Content, Modifying Factors, Associated Signs & Symptoms)    The patient was seen and examined. The chart was reviewed.      Staff reports no behavioral or management issues. Patient interacting appropriately in milieu.     The patient has been compliant with treatment. The patient denied any side effects.     The patient continues to deny all psychiatric symptoms as documented below. He reports that he wants to go to a group home and non longer live with his family. He continues to oddly relate with staff and peers. He is less irritable and more interactive. He is better caring for his ADLs.     He was accepted into a group home; his mother will see this program and see if it is appropriate for the patient given his intellectual deficits and behavioral issues.     Denies psychiatric symptoms as below:    Denied Symptoms of Depression: no diminished mood or loss of interest/anhedonia; no irritability, diminished energy, change in sleep, change in appetite, diminished concentration or cognition or indecisiveness, PMA/R, excessive guilt or hopelessness or worthlessness, or suicidal ideations     Denied changes in Sleep: no trouble with initiation, maintenanc, or early morning awakening with inability to return to sleep     Denied Suicidal/Homicidal ideations: no active/passive ideations, organized plans,or  future intentions     Denied Symptoms of psychosis: no hallucinations, delusions, disorganized thinking, disorganized behavior or abnormal motor behavior, or  negative symptoms     Denied Symptoms of romi or hypomania: no elevated, expansive, or irritable mood with no increased energy or activity; with no inflated self-esteem or grandiosity, decreased need for sleep, increased rate of speech, FOI or racing thoughts, distractibility, increased goal directed activity or PMA, or risky/disinhibited behavior     Denied Symptoms of Anxiety: no excessive anxiety/worry/fear; no panic attacks.       ROS  General ROS: negative  Ophthalmic ROS: negative  ENT ROS: negative  Allergy and Immunology ROS: negative  Hematological and Lymphatic ROS: negative  Endocrine ROS: negative  Respiratory ROS: no cough, shortness of breath, or wheezing  Cardiovascular ROS: no chest pain or dyspnea on exertion  Gastrointestinal ROS: no abdominal pain, change in bowel habits, or black or bloody stools  Genito-Urinary ROS: no dysuria, trouble voiding, or hematuria  Musculoskeletal ROS: negative  Neurological ROS: no TIA or stroke symptoms  Dermatological ROS: negative    PAST MEDICAL HISTORY   Past Medical History:   Diagnosis Date    ADHD (attention deficit hyperactivity disorder)     Bipolar disorder     History of psychiatric hospitalization     Hx of psychiatric care     Romi     Mood disorder     Psychiatric problem     Seizures     Therapy            PSYCHOTROPIC MEDICATIONS   Scheduled Meds:   cholecalciferol (vitamin D3)  50,000 Units Oral Q7 Days    divalproex  500 mg Oral BID    multivitamin  1 tablet Oral Daily    quetiapine  100 mg Oral QHS     Continuous Infusions:   PRN Meds:.acetaminophen, aluminum-magnesium hydroxide-simethicone, docusate sodium, hydrOXYzine pamoate, loperamide, nicotine, olanzapine **AND** olanzapine, pneumoc 13-donnell conj-dip cr(PF)        EXAMINATION    VITALS   Vitals:    05/31/17 0833   BP: 120/81   Pulse: 69   Resp: 18   Temp: 97 °F (36.1 °C)       CONSTITUTIONAL  General Appearance: stated age, casual dress    NEUROLOGIC  Abnormal Involuntary  "Movements: none noted  Gait and Station: normal gate    PSYCHIATRIC   Level of Consciousness: alert, awake  Orientation: grossly oriented  Grooming: appropriate for setting  Psychomotor Behavior: no PMR/PMA  Speech: mumbled at times  Mood: "ok"  Affect: congruent, constricted  Thought Process: more linear and goal directed  Thought Content: no SI/HI, no AH/VH  Memory: grossly intact but limited, likely by cognition  Attention: intact to conversation  Estimate if Intelligence:  Below to low-average based on work/education history, vocabulary and mental status exam  Insight: limited- limited understanding of anger issues and tx needs/plan  Judgment: fair- no bx issues, compliant and cooperative; reports of recent violnece        DIAGNOSTIC TESTING   Laboratory Results  No results found for this or any previous visit (from the past 24 hour(s)).      MEDICAL DECISION MAKING    DIAGNOSES  Unspecified mood Disorder  Intermittent Explosive Disorder     Nicotine Dependence      Microcytic anemia  Vitamin D insufficiency  H/o seizure disorder    PROBLEM LIST AND MANAGEMENT PLANS  Mood  IED  Nicotine use  Anemia  H/o seizure disorder  Vitamin D insufficiency    PRESCRIPTION DRUG MANAGEMENT  Compliance: yes  Side Effects: no  Regimen Adjustments:   -Depakote 500 mg po BID for mood stabilization, off-label IED, and seizure ppx  -Seroquel 100 po q HS for adjunctive mood disorder  -Vitamin D3 50,000 units po q week x 8 weeks (1/8 completed) for Vitamin D insufficiency      Nicotine patch daily for nicotine cessation; patient counseled    Microcytic anemia- f/u with PCP     Labs:  Labs reviewed    DISCHARGE PLANNING  -SW to assist with aftercare planning and collateral  -Once stable, discharge to group home with outpatient follow up care and/or rehab  -Continue inpatient treatment under a PEC and/or CEC for danger to others as evidenced by HI in context of impulsivity, problems with mood, and suspected cognitive impairment.  " Patient with limited insight and poor judgement. Not attending to ADL's. Patient is improving and will likley be stable for discharge Wednesday (tomorrow).     NEED FOR CONTINUED HOSPITALIZATION  Psychiatric illness continues to pose a potential threat to life or bodily function, of self or others, thereby requiring the need for continued inpatient psychiatric hospitalization: Yes    Protective inpatient pyschiatric hospitalization required while a safe disposition plan is enacted: Yes    Patient stabilized and ready for discharge from inpatient psychiatric unit: No      STAFF:   Kameron Freeman MD  Psychiatry

## 2017-05-31 NOTE — PLAN OF CARE
"Problem: Overarching Goals (Adult)  Goal: Develops/Participates in Therapeutic [place] to Support Successful Transition  Group Note    Behavior:  Patient attended Psychotherapy Group and participated. Patient was drawing before group and was resistant to parcticipating but eventually did.     Intervention:   Gratitude as a Coping Skill. - Provided patients with a "new product" and and discussed the benefits (of gratitude) and solicited their guesses as to what it was. Patients reflected on four different aspects of gratitude (a gratitude ritual, an everyday blessing that was once taken for granted, someone they are grateful to, and a way they have thanked someone.) then shared  Patients were encouraged to recognize gratitude daily and express it to improve physical, mental and emotional health and to cope more effectively. .      Response:  Patient states he gives thanks for his family especially his brother and mother. Patient states he never got a chance to thank his cousin before he . Discussed coping with grief.  Patient states he wants to work with animals.      Plan:  Will continue to encourage patient participation in group.          "

## 2017-05-31 NOTE — PSYCH
Patient making threatening statements about hurting his roommate. Patient redirectable. MD notified. Roommate moved to another room.

## 2017-05-31 NOTE — PSYCH
The patient has been accepted at [facility], [address] [place] [address]. [id number]. I have contacted the patient's mother.who has related that she is going to the [facility] on Friday to pay any fees and rent to the group home. The patient has signed  a release of information form for  [facility], [facility] has been  Faxed to obtain an aftercare appointment.  All prescriptions should be faxed to [facility], phone number is [phone]. The faxed number is [phone].

## 2017-05-31 NOTE — PSYCH
I spoke with a representative of the [facility] regarding placement in their Transitional Living Program; however, they are full at this time. I also informed the patient and his mother that the [facility] does not have a bed at this time. The patient signed a release of information for [facility]group home. The patient 's mother is in agreement with the patient inquiring about the aforementioned facility. I spoke with Audrey at [facility] who informed me that she does have an opening for a male bad. The patient's mother has been given the phone number of [facility] because she has asked to set up a meeting to visit the site. Jose's mother will call me today after visiting the site. I have expressed to the patient's mother that discharge is schedule within 24 hours.

## 2017-05-31 NOTE — PLAN OF CARE
Problem: Patient Care Overview (Adult)  Goal: Plan of Care Review  Outcome: Ongoing (interventions implemented as appropriate)  Pt has slept 6 hours with one interruption.  NAD.  Resp even & unlabored.  Pathways clear and bed is low.  Q 15 minute safety checks ongoing.  All precautions maintained.

## 2017-05-31 NOTE — PLAN OF CARE
Problem: Patient Care Overview (Adult)  Goal: Plan of Care Review  Outcome: Ongoing (interventions implemented as appropriate)  Pt calm and cooperative, appetite good, med compliant, vital signs stable , safety precautions maintained, will continue to monitor

## 2017-05-31 NOTE — PLAN OF CARE
Problem: Patient Care Overview (Adult)  Goal: Plan of Care Review  Outcome: Ongoing (interventions implemented as appropriate)  Shift note : patient states that he is excited to be going to the group home . He is in the day room with staff and peers . He is eating all meals and taking all ordered medications . He is cooperative he is attending groups.

## 2017-06-01 VITALS
HEIGHT: 69 IN | WEIGHT: 258 LBS | RESPIRATION RATE: 18 BRPM | BODY MASS INDEX: 38.21 KG/M2 | DIASTOLIC BLOOD PRESSURE: 63 MMHG | TEMPERATURE: 97 F | HEART RATE: 73 BPM | SYSTOLIC BLOOD PRESSURE: 133 MMHG

## 2017-06-01 PROBLEM — R45.850 HOMICIDAL THOUGHTS: Status: RESOLVED | Noted: 2017-05-26 | Resolved: 2017-06-01

## 2017-06-01 PROCEDURE — 63600175 PHARM REV CODE 636 W HCPCS: Performed by: PSYCHIATRY & NEUROLOGY

## 2017-06-01 PROCEDURE — 90471 IMMUNIZATION ADMIN: CPT | Performed by: PSYCHIATRY & NEUROLOGY

## 2017-06-01 PROCEDURE — 25000003 PHARM REV CODE 250: Performed by: PSYCHIATRY & NEUROLOGY

## 2017-06-01 PROCEDURE — 99239 HOSP IP/OBS DSCHRG MGMT >30: CPT | Mod: HB,AF,S$PBB, | Performed by: PSYCHIATRY & NEUROLOGY

## 2017-06-01 PROCEDURE — 90670 PCV13 VACCINE IM: CPT | Performed by: PSYCHIATRY & NEUROLOGY

## 2017-06-01 RX ORDER — ASPIRIN 325 MG
50000 TABLET, DELAYED RELEASE (ENTERIC COATED) ORAL
COMMUNITY
Start: 2017-06-01

## 2017-06-01 RX ORDER — IBUPROFEN 200 MG
1 TABLET ORAL DAILY PRN
Refills: 0 | Status: ON HOLD | COMMUNITY
Start: 2017-06-01 | End: 2019-10-17 | Stop reason: SDUPTHER

## 2017-06-01 RX ORDER — QUETIAPINE FUMARATE 100 MG/1
100 TABLET, FILM COATED ORAL NIGHTLY
Qty: 30 TABLET | Refills: 1 | Status: ON HOLD | OUTPATIENT
Start: 2017-06-01 | End: 2019-10-17

## 2017-06-01 RX ORDER — DIVALPROEX SODIUM 500 MG/1
500 TABLET, DELAYED RELEASE ORAL 2 TIMES DAILY
Qty: 60 TABLET | Refills: 1 | Status: ON HOLD | OUTPATIENT
Start: 2017-06-01 | End: 2019-10-17

## 2017-06-01 RX ADMIN — PNEUMOCOCCAL 13-VALENT CONJUGATE VACCINE 0.5 ML: 2.2; 2.2; 2.2; 2.2; 2.2; 4.4; 2.2; 2.2; 2.2; 2.2; 2.2; 2.2; 2.2 INJECTION, SUSPENSION INTRAMUSCULAR at 10:06

## 2017-06-01 RX ADMIN — DIVALPROEX SODIUM 500 MG: 500 TABLET, DELAYED RELEASE ORAL at 08:06

## 2017-06-01 RX ADMIN — THERA TABS 1 TABLET: TAB at 08:06

## 2017-06-01 NOTE — PLAN OF CARE
Problem: Patient Care Overview (Adult)  Goal: Plan of Care Review  Outcome: Ongoing (interventions implemented as appropriate)  Shift note : patient is stable for discharge to the group home today . He is very happy to be going there.

## 2017-06-01 NOTE — PLAN OF CARE
Problem: Patient Care Overview (Adult)  Goal: Plan of Care Review  Outcome: Ongoing (interventions implemented as appropriate)  Lying quiet in bed, eyes closed, respiration even and unlabored, appearing asleep.  Slept well most all shift with one brief awakening when up to bathroom.  Soon back to sleep.  Safety and precautions maintained with rounds every 15 minutes, bed is fixed in a low position and pathways kept clear.  No fall occurred.

## 2017-06-01 NOTE — PLAN OF CARE
Problem: Patient Care Overview (Adult)  Goal: Plan of Care Review  Outcome: Ongoing (interventions implemented as appropriate)  Out on unit currently talking on phone.  Has been out on unit attending to ADL's and ate snacks.  Spoke of going to group home tomorrow.  Appearing a little anxious but is coping adequately. Will continue to monitor.  Safety and precautions maintained, bed low and pathway kept clear.    Problem: Cognitive Impairment (Psychotic Signs/Symptoms) (Adult)  Intervention: Promote Thought Clarity/Organization  Thoughts seem clear and organized.

## 2017-06-01 NOTE — NURSING
Plan of care reviewed with patient, patient in agreement. Safety plan discussed. Patient being discharged from [facility].  Discharge summary and AVS reviewed with patient. [facility] here on unit to transport patient to [facility] in [place], [place].  AVS copy, prescriptions, medication prescriptions, and discharge instructions reviewed with patient and issued.  Belongings issued to patient.  Escorted off unit and out of hospital by [facility].

## 2017-06-01 NOTE — PSYCH
Patient will be following up at [facility] at [address] [place] [address] In [place], [phone].  Appointment is on 6/7/2017 at 7:30 pm.  Patient will receive a tobacco cessation therapy appointment at mentioned appointment.  AVS, discharge summary and final MAR faxed on 6/1/2017 at 12:10 pm.

## 2017-06-01 NOTE — DISCHARGE SUMMARY
"Discharge Summary  Psychiatry    Admit Date: 5/26/2017    Discharge Date and Time:  06/01/2017 9:49 AM    Attending Physician: Kameron Freeman MD     Discharge Provider: Kameron Freeman MD    Reason for Admission:  homicidal ideations    History of Present Illness:   The patient presented to the ER on 5/25/17 with complaints of  "my cousin called me with some mess." Pt reports the "mess" may or may not make him want to fight. Per the ER reports:  -This is a 21 y.o. male with a Hx of mood disorder, who presents for psychiatric evaluation for social stressors. Pt states he received a phone call from his cousin stating that he was included in "some mess". The Pt notes that he is concerned about his well being being because he "does not have time for all that fighting". Pt denies any SI, HI, or audio/visual hallucinations. He admits to being compliant with his psychiatric medications. Pt has no other symptomatic complaints at time.      The patient was medically cleared and admitted to the [facility].      The patient reports that he is in the hospital for having an altercation with his cousin. He and his cousin had a disagreement; they accused hiom of doing drugs, which he denied, and this upset him. He denied making any statements regarding HI.      He reports that he was diagnosed with "bipolar and mood swings," because "I get mad easily." He is on Depakote and Seroquel. He reports a history of mood swings, with some depressive episodes lasting at most week. He reports a history of "ups" which last a few hours.     The patient was a very poor and vague historian. There is a possible history of intellectual deficits. He denied any current psychiatric symptoms.      Denied Symptoms of Depression: no diminished mood or loss of interest/anhedonia; no irritability, diminished energy, change in sleep, change in appetite, diminished concentration or cognition or indecisiveness, PMA/R, excessive guilt or hopelessness or " worthlessness, or suicidal ideations     Denied changes in Sleep: no trouble with initiation, maintenanc, or early morning awakening with inability to return to sleep     Denied Suicidal/Homicidal ideations: no active/passive ideations, organized plans,or  future intentions     Denied Symptoms of psychosis: no hallucinations, delusions, disorganized thinking, disorganized behavior or abnormal motor behavior, or negative symptoms     Denied Symptoms of temitope or hypomania: no elevated, expansive, or irritable mood with no increased energy or activity; with no inflated self-esteem or grandiosity, decreased need for sleep, increased rate of speech, FOI or racing thoughts, distractibility, increased goal directed activity or PMA, or risky/disinhibited behavior     Denied Symptoms of HUGO: no excessive anxiety/worry/fear; with no restlessness, fatigue, poor concentration, irritability, muscle tension, or sleep disturbance     Denied Symptoms of Panic Disorder: no recurrent panic attacks; without agoraphobia     Denied Symptoms of PTSD: no h/o trauma; no re-experiencing/intrusive symptoms, avoidant behavior, negative alterations in cognition or mood, or hyperarousal symptoms; without dissociative symptoms      Denied Symptoms of OCD: no obsessions or compulsions      Denied Symptoms of Eating Disorders: no anorexia, bulimia or binging     Denied Substance Use: no intoxication, withdrawal, tolerance, used in larger amounts or duration than intended, unsuccessful attempts to limit or quit, increased time engaging in or seeking out, cravings or strong desire to use, failure to fulfill obligations, negative consequences in social/interpersonal/occupational,/recreational areas, use in dangerous situations, or medical or psychological consequences         Procedures Performed: * No surgery found *    Hospital Course (synopsis of major diagnoses, care, treatment, and services provided during the course of the hospital stay):   The  patient was stabilized and discharged on the following medications:  -Depakote 500 mg po BID for mood stabilization, off-label IED, and seizure ppx  -Seroquel 100 po q HS for adjunctive mood disorder  -Vitamin D3 50,000 units po q week x 8 weeks (1/8 completed) for Vitamin D insufficiency      -Nicotine patch daily for nicotine cessation; patient counseled     -Microcytic anemia- f/u with PCP    The patient was compliant with treatment. The patient denied any side effects.     Denied Symptoms of Depression: no diminished mood or loss of interest/anhedonia; no irritability, diminished energy, change in sleep, change in appetite, diminished concentration or cognition or indecisiveness, PMA/R, excessive guilt or hopelessness or worthlessness, or suicidal ideations     Denied changes in Sleep: no trouble with initiation, maintenanc, or early morning awakening with inability to return to sleep     Denied Suicidal/Homicidal ideations: no active/passive ideations, organized plans,or  future intentions     Denied Symptoms of psychosis: no hallucinations, delusions, disorganized thinking, disorganized behavior or abnormal motor behavior, or negative symptoms     Denied Symptoms of temitope or hypomania: no elevated, expansive, or irritable mood with no increased energy or activity; with no inflated self-esteem or grandiosity, decreased need for sleep, increased rate of speech, FOI or racing thoughts, distractibility, increased goal directed activity or PMA, or risky/disinhibited behavior     Denied Symptoms of Anxiety: no excessive anxiety/worry/fear; no panic attacks.     Discussed diagnosis, risks and benefits of proposed treatment vs alternative treatments vs no treatment, and potential side effects of these treatments.  The patient expresses understanding of the above and displays the capacity to agree with this treatment given said understanding.  Patient also agrees that, currently, the benefits outweigh the risks and  would like to pursue treatment at this time.    MSE: stated age, casually dressed, well groomed.  No psychomotor agitation or retardation.  No abnormal involuntary movements.  Gait normal.  Speech normal, conversational.  Language fluent English. Mood fine.  Affect normal range, pleasant, euthymic.  Thought process linear.  Associations intact.  Denies suicidal or homicidal ideation.  Denies auditory hallucinations, paranoid ideation, ideas of reference.  Memory intact.  Attention intact.  Fund of knowledge intact.  Insight intact.  Judgment intact.  Alert and oriented to person, place, time.      Tobacco Usage:  Is patient a smoker? Yes  Does patient want prescription for Tobacco Cessation? No  Does patient want counseling for Tobacco Cessation? No    If patient would like to quit, then over the counter nicotine patch could be used. The patient could also follow up with his PCP or psychiatric provider for other alternatives.     Final Diagnoses:    Principal Problem: Unspecified mood Disorder   Secondary Diagnoses:   Intermittent Explosive Disorder     Nicotine Dependence      Microcytic anemia  Vitamin D insufficiency  H/o seizure disorder    Labs:  Admission on 05/26/2017   Component Date Value Ref Range Status    RPR 05/26/2017 Non-reactive  Non-reactive Final    Cholesterol 05/26/2017 176  120 - 199 mg/dL Final    Triglycerides 05/26/2017 96  30 - 150 mg/dL Final    HDL 05/26/2017 42  40 - 75 mg/dL Final    LDL Cholesterol 05/26/2017 114.8  63.0 - 159.0 mg/dL Final    HDL/Chol Ratio 05/26/2017 23.9  20.0 - 50.0 % Final    Total Cholesterol/HDL Ratio 05/26/2017 4.2  2.0 - 5.0 Final    Non-HDL Cholesterol 05/26/2017 134  mg/dL Final    Ferritin 05/26/2017 53  20.0 - 300.0 ng/mL Final    Iron 05/26/2017 41* 45 - 160 ug/dL Final    Transferrin 05/26/2017 213  200 - 375 mg/dL Final    TIBC 05/26/2017 315  250 - 450 ug/dL Final    Saturated Iron 05/26/2017 13* 20 - 50 % Final    Vit D, 25-Hydroxy  05/26/2017 20* 30 - 96 ng/mL Final    Vitamin B-12 05/26/2017 791  210 - 950 pg/mL Final    Folate 05/26/2017 11.6  4.0 - 24.0 ng/mL Final    Valproic Acid Lvl 05/29/2017 62.2  50.0 - 100.0 ug/mL Final    Sodium 05/29/2017 140  136 - 145 mmol/L Final    Potassium 05/29/2017 4.2  3.5 - 5.1 mmol/L Final    Chloride 05/29/2017 103  95 - 110 mmol/L Final    CO2 05/29/2017 28  23 - 29 mmol/L Final    Glucose 05/29/2017 79  70 - 110 mg/dL Final    BUN, Bld 05/29/2017 8  6 - 20 mg/dL Final    Creatinine 05/29/2017 1.0  0.5 - 1.4 mg/dL Final    Calcium 05/29/2017 9.3  8.7 - 10.5 mg/dL Final    Total Protein 05/29/2017 7.1  6.0 - 8.4 g/dL Final    Albumin 05/29/2017 3.2* 3.5 - 5.2 g/dL Final    Total Bilirubin 05/29/2017 0.2  0.1 - 1.0 mg/dL Final    Alkaline Phosphatase 05/29/2017 48* 55 - 135 U/L Final    AST 05/29/2017 15  10 - 40 U/L Final    ALT 05/29/2017 18  10 - 44 U/L Final    Anion Gap 05/29/2017 9  8 - 16 mmol/L Final    eGFR if African American 05/29/2017 >60  >60 mL/min/1.73 m^2 Final    eGFR if non African American 05/29/2017 >60  >60 mL/min/1.73 m^2 Final   Admission on 05/25/2017, Discharged on 05/26/2017   Component Date Value Ref Range Status    WBC 05/26/2017 6.52  3.90 - 12.70 K/uL Final    RBC 05/26/2017 4.91  4.60 - 6.20 M/uL Final    Hemoglobin 05/26/2017 12.6* 14.0 - 18.0 g/dL Final    Hematocrit 05/26/2017 39.5* 40.0 - 54.0 % Final    MCV 05/26/2017 80* 82 - 98 fL Final    MCH 05/26/2017 25.7* 27.0 - 31.0 pg Final    MCHC 05/26/2017 31.9* 32.0 - 36.0 % Final    RDW 05/26/2017 14.2  11.5 - 14.5 % Final    Platelets 05/26/2017 209  150 - 350 K/uL Final    MPV 05/26/2017 11.1  9.2 - 12.9 fL Final    Gran # 05/26/2017 4.2  1.8 - 7.7 K/uL Final    Lymph # 05/26/2017 1.4  1.0 - 4.8 K/uL Final    Mono # 05/26/2017 0.7  0.3 - 1.0 K/uL Final    Eos # 05/26/2017 0.2  0.0 - 0.5 K/uL Final    Baso # 05/26/2017 0.01  0.00 - 0.20 K/uL Final    nRBC 05/26/2017 0  0 /100 WBC Final     Gran% 05/26/2017 63.9  38.0 - 73.0 % Final    Lymph% 05/26/2017 21.8  18.0 - 48.0 % Final    Mono% 05/26/2017 10.6  4.0 - 15.0 % Final    Eosinophil% 05/26/2017 3.5  0.0 - 8.0 % Final    Basophil% 05/26/2017 0.2  0.0 - 1.9 % Final    Differential Method 05/26/2017 Automated   Final    Sodium 05/26/2017 140  136 - 145 mmol/L Final    Potassium 05/26/2017 3.8  3.5 - 5.1 mmol/L Final    Chloride 05/26/2017 105  95 - 110 mmol/L Final    CO2 05/26/2017 26  23 - 29 mmol/L Final    Glucose 05/26/2017 97  70 - 110 mg/dL Final    BUN, Bld 05/26/2017 15  6 - 20 mg/dL Final    Creatinine 05/26/2017 1.2  0.5 - 1.4 mg/dL Final    Calcium 05/26/2017 9.1  8.7 - 10.5 mg/dL Final    Total Protein 05/26/2017 7.5  6.0 - 8.4 g/dL Final    Albumin 05/26/2017 3.8  3.5 - 5.2 g/dL Final    Total Bilirubin 05/26/2017 0.5  0.1 - 1.0 mg/dL Final    Alkaline Phosphatase 05/26/2017 58  55 - 135 U/L Final    AST 05/26/2017 27  10 - 40 U/L Final    ALT 05/26/2017 24  10 - 44 U/L Final    Anion Gap 05/26/2017 9  8 - 16 mmol/L Final    eGFR if African American 05/26/2017 >60.0  >60 mL/min/1.73 m^2 Final    eGFR if non African American 05/26/2017 >60.0  >60 mL/min/1.73 m^2 Final    TSH 05/26/2017 3.170  0.400 - 4.000 uIU/mL Final    Specimen UA 05/26/2017 Urine, Clean Catch   Final    Color, UA 05/26/2017 Yellow  Yellow, Straw, Kirsten Final    Appearance, UA 05/26/2017 Clear  Clear Final    pH, UA 05/26/2017 5.0  5.0 - 8.0 Final    Specific Gravity, UA 05/26/2017 1.030  1.005 - 1.030 Final    Protein, UA 05/26/2017 1+* Negative Final    Glucose, UA 05/26/2017 Negative  Negative Final    Ketones, UA 05/26/2017 Trace* Negative Final    Bilirubin (UA) 05/26/2017 Negative  Negative Final    Occult Blood UA 05/26/2017 Negative  Negative Final    Nitrite, UA 05/26/2017 Negative  Negative Final    Urobilinogen, UA 05/26/2017 4.0-6.0* <2.0 EU/dL Final    Leukocytes, UA 05/26/2017 Negative  Negative Final     Benzodiazepines 05/26/2017 Negative   Final    Cocaine (Metab.) 05/26/2017 Negative   Final    Opiate Scrn, Ur 05/26/2017 Negative   Final    Barbiturate Screen, Ur 05/26/2017 Negative   Final    Amphetamine Screen, Ur 05/26/2017 Negative   Final    THC 05/26/2017 Negative   Final    Phencyclidine 05/26/2017 Negative   Final    Creatinine, Random Ur 05/26/2017 540.6* 23.0 - 375.0 mg/dL Final    Toxicology Information 05/26/2017 SEE COMMENT   Final    Alcohol, Medical, Serum 05/26/2017 <5  <10 mg/dL Final    Acetaminophen (Tylenol), Serum 05/26/2017 <10.0* 10.0 - 20.0 ug/mL Final    RBC, UA 05/26/2017 1  0 - 4 /hpf Final    WBC, UA 05/26/2017 1  0 - 5 /hpf Final    Bacteria, UA 05/26/2017 None  None-Occ /hpf Final    Squam Epithel, UA 05/26/2017 0  /hpf Final    Hyaline Casts, UA 05/26/2017 0  0-1/lpf /lpf Final    Microscopic Comment 05/26/2017 SEE COMMENT   Final         Discharged Condition: stable and improved; not currently a danger to self/others or gravely disabled    Disposition: Group Home    Is patient being discharged on multiple neuroleptics? No    Follow Up/Patient Instructions:     Medications:  Reconciled Home Medications:   Current Discharge Medication List      START taking these medications    Details   cholecalciferol, vitamin D3, 50,000 unit capsule Take 1 capsule (50,000 Units total) by mouth every 7 days.      nicotine (NICODERM CQ) 14 mg/24 hr Place 1 patch onto the skin daily as needed (nicotine withdrawal).  Refills: 0         CONTINUE these medications which have CHANGED    Details   divalproex (DEPAKOTE) 500 MG TbEC Take 1 tablet (500 mg total) by mouth 2 (two) times daily.  Qty: 60 tablet, Refills: 1      quetiapine (SEROQUEL) 100 MG Tab Take 1 tablet (100 mg total) by mouth every evening.  Qty: 30 tablet, Refills: 1         STOP taking these medications       permethrin (ELIMITE) 5 % cream Comments:   Reason for Stopping:             No discharge procedures on  file.  Follow-up Information     Go to [facility].    Specialties:  Behavioral Health, Psychiatry, Psychology  Why:  Outpatient Psych Services  Contact information:  [id number] [place] [address] [phone]                     Diet: regular     Activity as tolerated    Total time spent discharging patient: 32 minutes    Kameron Freeman MD  Psychiatry

## 2017-06-01 NOTE — NURSING
Discharge note : patient is in bright mood . He is cooperative . He denies suicidal , homicidal ideations and he is not gravely disabled . All discharge follow up and medications were reviewed with patient and patient expresses understanding . [facility] is [facility] in [place] . He is happy about this . He will be going there per medicaid transportation . [facility]  [phone] . He has all of his belongings

## 2019-10-08 ENCOUNTER — HOSPITAL ENCOUNTER (EMERGENCY)
Facility: HOSPITAL | Age: 24
Discharge: PSYCHIATRIC OR SUBSTANCE USE FACILITY | End: 2019-10-08
Attending: SURGERY
Payer: MEDICARE

## 2019-10-08 VITALS
SYSTOLIC BLOOD PRESSURE: 141 MMHG | RESPIRATION RATE: 20 BRPM | DIASTOLIC BLOOD PRESSURE: 79 MMHG | HEIGHT: 69 IN | WEIGHT: 228 LBS | TEMPERATURE: 98 F | HEART RATE: 84 BPM | BODY MASS INDEX: 33.77 KG/M2 | OXYGEN SATURATION: 100 %

## 2019-10-08 DIAGNOSIS — Z00.8 MEDICAL CLEARANCE FOR PSYCHIATRIC ADMISSION: Primary | ICD-10-CM

## 2019-10-08 LAB
ALBUMIN SERPL BCP-MCNC: 3.7 G/DL (ref 3.5–5.2)
ALP SERPL-CCNC: 59 U/L (ref 55–135)
ALT SERPL W/O P-5'-P-CCNC: 14 U/L (ref 10–44)
AMPHET+METHAMPHET UR QL: NEGATIVE
ANION GAP SERPL CALC-SCNC: 9 MMOL/L (ref 8–16)
APAP SERPL-MCNC: <3 UG/ML (ref 10–20)
AST SERPL-CCNC: 21 U/L (ref 10–40)
BARBITURATES UR QL SCN>200 NG/ML: NEGATIVE
BASOPHILS # BLD AUTO: 0.01 K/UL (ref 0–0.2)
BASOPHILS NFR BLD: 0.1 % (ref 0–1.9)
BENZODIAZ UR QL SCN>200 NG/ML: NEGATIVE
BILIRUB SERPL-MCNC: 0.3 MG/DL (ref 0.1–1)
BILIRUB UR QL STRIP: NEGATIVE
BUN SERPL-MCNC: 11 MG/DL (ref 6–20)
BZE UR QL SCN: NEGATIVE
CALCIUM SERPL-MCNC: 9.2 MG/DL (ref 8.7–10.5)
CANNABINOIDS UR QL SCN: NEGATIVE
CHLORIDE SERPL-SCNC: 102 MMOL/L (ref 95–110)
CLARITY UR: CLEAR
CO2 SERPL-SCNC: 27 MMOL/L (ref 23–29)
COLOR UR: YELLOW
CREAT SERPL-MCNC: 1.1 MG/DL (ref 0.5–1.4)
CREAT UR-MCNC: 254.4 MG/DL (ref 23–375)
DIFFERENTIAL METHOD: ABNORMAL
EOSINOPHIL # BLD AUTO: 0.1 K/UL (ref 0–0.5)
EOSINOPHIL NFR BLD: 2.1 % (ref 0–8)
ERYTHROCYTE [DISTWIDTH] IN BLOOD BY AUTOMATED COUNT: 13.5 % (ref 11.5–14.5)
EST. GFR  (AFRICAN AMERICAN): >60 ML/MIN/1.73 M^2
EST. GFR  (NON AFRICAN AMERICAN): >60 ML/MIN/1.73 M^2
ETHANOL SERPL-MCNC: <10 MG/DL
GLUCOSE SERPL-MCNC: 82 MG/DL (ref 70–110)
GLUCOSE UR QL STRIP: NEGATIVE
HCT VFR BLD AUTO: 40.3 % (ref 40–54)
HGB BLD-MCNC: 12.9 G/DL (ref 14–18)
HGB UR QL STRIP: NEGATIVE
IMM GRANULOCYTES # BLD AUTO: 0.01 K/UL (ref 0–0.04)
IMM GRANULOCYTES NFR BLD AUTO: 0.1 % (ref 0–0.5)
KETONES UR QL STRIP: NEGATIVE
LEUKOCYTE ESTERASE UR QL STRIP: NEGATIVE
LYMPHOCYTES # BLD AUTO: 0.9 K/UL (ref 1–4.8)
LYMPHOCYTES NFR BLD: 13.2 % (ref 18–48)
MCH RBC QN AUTO: 26.5 PG (ref 27–31)
MCHC RBC AUTO-ENTMCNC: 32 G/DL (ref 32–36)
MCV RBC AUTO: 83 FL (ref 82–98)
METHADONE UR QL SCN>300 NG/ML: NEGATIVE
MONOCYTES # BLD AUTO: 0.7 K/UL (ref 0.3–1)
MONOCYTES NFR BLD: 10.1 % (ref 4–15)
NEUTROPHILS # BLD AUTO: 5 K/UL (ref 1.8–7.7)
NEUTROPHILS NFR BLD: 74.4 % (ref 38–73)
NITRITE UR QL STRIP: NEGATIVE
NRBC BLD-RTO: 0 /100 WBC
OPIATES UR QL SCN: NEGATIVE
PCP UR QL SCN>25 NG/ML: NEGATIVE
PH UR STRIP: 7 [PH] (ref 5–8)
PLATELET # BLD AUTO: 201 K/UL (ref 150–350)
PMV BLD AUTO: 10.1 FL (ref 9.2–12.9)
POTASSIUM SERPL-SCNC: 4.2 MMOL/L (ref 3.5–5.1)
PROT SERPL-MCNC: 7.7 G/DL (ref 6–8.4)
PROT UR QL STRIP: NEGATIVE
RBC # BLD AUTO: 4.86 M/UL (ref 4.6–6.2)
SALICYLATES SERPL-MCNC: <5 MG/DL (ref 15–30)
SODIUM SERPL-SCNC: 138 MMOL/L (ref 136–145)
SP GR UR STRIP: 1.01 (ref 1–1.03)
TOXICOLOGY INFORMATION: NORMAL
TSH SERPL DL<=0.005 MIU/L-ACNC: 1.31 UIU/ML (ref 0.4–4)
URN SPEC COLLECT METH UR: NORMAL
UROBILINOGEN UR STRIP-ACNC: NEGATIVE EU/DL
WBC # BLD AUTO: 6.76 K/UL (ref 3.9–12.7)

## 2019-10-08 PROCEDURE — 80307 DRUG TEST PRSMV CHEM ANLYZR: CPT

## 2019-10-08 PROCEDURE — 84443 ASSAY THYROID STIM HORMONE: CPT

## 2019-10-08 PROCEDURE — 80320 DRUG SCREEN QUANTALCOHOLS: CPT

## 2019-10-08 PROCEDURE — 36415 COLL VENOUS BLD VENIPUNCTURE: CPT

## 2019-10-08 PROCEDURE — 80329 ANALGESICS NON-OPIOID 1 OR 2: CPT

## 2019-10-08 PROCEDURE — 85025 COMPLETE CBC W/AUTO DIFF WBC: CPT

## 2019-10-08 PROCEDURE — 81003 URINALYSIS AUTO W/O SCOPE: CPT | Mod: 59

## 2019-10-08 PROCEDURE — 99285 EMERGENCY DEPT VISIT HI MDM: CPT

## 2019-10-08 PROCEDURE — 80053 COMPREHEN METABOLIC PANEL: CPT

## 2019-10-08 RX ORDER — SERTRALINE HYDROCHLORIDE 50 MG/1
50 TABLET, FILM COATED ORAL DAILY
Status: ON HOLD | COMMUNITY
End: 2019-10-17 | Stop reason: HOSPADM

## 2019-10-08 RX ORDER — HALOPERIDOL 5 MG/ML
5 INJECTION INTRAMUSCULAR EVERY 4 HOURS PRN
Status: DISCONTINUED | OUTPATIENT
Start: 2019-10-08 | End: 2019-10-08 | Stop reason: HOSPADM

## 2019-10-08 RX ORDER — DIPHENHYDRAMINE HYDROCHLORIDE 50 MG/ML
50 INJECTION INTRAMUSCULAR; INTRAVENOUS EVERY 4 HOURS PRN
Status: DISCONTINUED | OUTPATIENT
Start: 2019-10-08 | End: 2019-10-08 | Stop reason: HOSPADM

## 2019-10-08 RX ORDER — LORAZEPAM 2 MG/ML
2 INJECTION INTRAMUSCULAR EVERY 4 HOURS PRN
Status: DISCONTINUED | OUTPATIENT
Start: 2019-10-08 | End: 2019-10-08 | Stop reason: HOSPADM

## 2019-10-08 NOTE — ED NOTES
Hospital Security notified and the patient was dressed out in paper scrubs. All belongings were inventoried and locked up per policy. Risk Sitter requested and continuous visual contact was initiated. Pt instructed to notify the sitter of any needs.

## 2019-10-08 NOTE — ED NOTES
The patient is resting comfortably with eyes open. Normal respiratory effort and rate noted, full ROM in all extremities. No change from previous assessment. Bed in low, locked position. Pt able to change position independently. Sitter present at bedside. Will continue to monitor.

## 2019-10-08 NOTE — ED NOTES
Claudia Terrell (mother) would like to be notified of patient's placement. [phone]. Pt gives consent.

## 2019-10-08 NOTE — ED TRIAGE NOTES
24 y.o. male presents to ER ED 03 /ED 03A   Chief Complaint   Patient presents with    Suicidal   Pt reports he has been having suicidial thoughts for a while. No plan. Taking Seroquel, lexapro, and Depakote, pt unsure of dosage. Cooperative and calm. No acute distress noted.

## 2019-10-08 NOTE — ED NOTES
The patient is resting comfortably with eyes open. Airway is open and patent, respirations are spontaneous, normal respiratory effort and rate noted, full ROM in all extremities, no distress noted, resting comfortably. No change from previous assessment. Bed in low, locked position. Pt able to change position independently. Sitter present at bedside. Will continue to monitor.

## 2019-10-08 NOTE — ED PROVIDER NOTES
Encounter Date: 10/8/2019       History     Chief Complaint   Patient presents with    Suicidal     Patient is 24-year-old black male with history of psychiatric hospitalization because of homicidal and suicidal ideation.  He presents today as a PEC, having thoughts of suicide and needing mental health, per his request.        Review of patient's allergies indicates:  No Known Allergies  Past Medical History:   Diagnosis Date    ADHD (attention deficit hyperactivity disorder)     Bipolar disorder     History of psychiatric hospitalization     Hx of psychiatric care     Romi     Mood disorder     Psychiatric problem     Seizures     Therapy      History reviewed. No pertinent surgical history.  History reviewed. No pertinent family history.  Social History     Tobacco Use    Smoking status: Current Every Day Smoker     Packs/day: 1.00     Types: Cigarettes    Tobacco comment: handout given and reviewed with pt, will have consult for smoking cessation   Substance Use Topics    Alcohol use: No    Drug use: No     Review of Systems   Unable to perform ROS: Psychiatric disorder       Physical Exam     Initial Vitals [10/08/19 1450]   BP Pulse Resp Temp SpO2   136/85 91 20 99 °F (37.2 °C) 98 %      MAP       --         Physical Exam    Nursing note and vitals reviewed.  Constitutional: He appears well-developed and well-nourished.   HENT:   Head: Normocephalic and atraumatic.   Eyes: EOM are normal. Pupils are equal, round, and reactive to light.   Neck: Normal range of motion. Neck supple.   Cardiovascular: Normal rate.   Pulmonary/Chest: Breath sounds normal. No respiratory distress. He has no wheezes. He has no rales.   Abdominal: Soft. He exhibits no distension. There is no tenderness.   Musculoskeletal: Normal range of motion.   Neurological: He is alert and oriented to person, place, and time.   Skin: Skin is warm and dry.         ED Course   Procedures  Labs Reviewed - No data to display        Imaging Results    None                               Clinical Impression:       ICD-10-CM ICD-9-CM   1. Medical clearance for psychiatric admission Z00.8 V70.8         Disposition:   Disposition: Transferred  Condition: Stable                        Mao Shi Jr., MD  10/08/19 [id number]

## 2019-10-08 NOTE — ED NOTES
Pt accepted at [facility], waiting on [facility] for transport, report given to Will.

## 2019-10-09 PROBLEM — F33.2 SEVERE RECURRENT MAJOR DEPRESSION WITHOUT PSYCHOTIC FEATURES: Status: ACTIVE | Noted: 2019-10-09

## 2019-10-25 NOTE — PROGRESS NOTES
PSYCHIATRY DAILY INPATIENT PROGRESS NOTE  SUBSEQUENT HOSPITAL VISIT    ENCOUNTER DATE: 5/28/2017  SITE: [facility]    DATE OF ADMISSION: 5/26/2017  3:58 AM  LENGTH OF STAY: 2 days      HISTORY    CHIEF COMPLAINT   Rosalinda Terrell is a 21 y.o. male, seen during daily koroma rounds on the inpatient unit.  Rosalinda Terrell presents with the chief complaint of homicidal ideations.    HPI   (Elements: Location, Quality, Severity, Duration, Timing, Content, Modifying Factors, Associated Signs & Symptoms)    The patient was seen and examined. The chart was reviewed.    Staff reports no behavioral or management issues. Patient interacting appropriately in milieu. Less pacing     The patient has been compliant with treatment. The patient denied any side effects.     On my interview, patient is calm and cooperative.  Malodorous; not attending appropriately to ADL's.  Encouraged patient to shower and change shirts.  Reports he feels like his seroquel dose is too low due to difficulty sleeping.  Discussed risks/benefits including metabolics symptoms.  Patient agrees benefits outweigh risks.  Patient continues to work to formulate discharge plan.  Forward thinking.  Patient able to manage behavior in highly structure environment.  Insight remains limited.  Concern for impulsive aggression if structure is not maintained appropriately.  Working with patient to identify appropriately supportive living arrangement on discharge.    Denies psychiatric symptoms as below:    Denied Symptoms of Depression: no diminished mood or loss of interest/anhedonia; no irritability, diminished energy, change in sleep, change in appetite, diminished concentration or cognition or indecisiveness, PMA/R, excessive guilt or hopelessness or worthlessness, or suicidal ideations     Denied changes in Sleep: no trouble with initiation, maintenanc, or early morning awakening with inability to return to sleep     Denied Suicidal/Homicidal  ideations: no active/passive ideations, organized plans,or  future intentions     Denied Symptoms of psychosis: no hallucinations, delusions, disorganized thinking, disorganized behavior or abnormal motor behavior, or negative symptoms     Denied Symptoms of temitope or hypomania: no elevated, expansive, or irritable mood with no increased energy or activity; with no inflated self-esteem or grandiosity, decreased need for sleep, increased rate of speech, FOI or racing thoughts, distractibility, increased goal directed activity or PMA, or risky/disinhibited behavior     Denied Symptoms of HUGO: no excessive anxiety/worry/fear; with no restlessness, fatigue, poor concentration, irritability, muscle tension, or sleep disturbance     Denied Symptoms of Panic Disorder: no recurrent panic attacks; without agoraphobia     Denied Symptoms of PTSD: no h/o trauma; no re-experiencing/intrusive symptoms, avoidant behavior, negative alterations in cognition or mood, or hyperarousal symptoms; without dissociative symptoms      Denied Symptoms of OCD: no obsessions or compulsions      Denied Symptoms of Eating Disorders: no anorexia, bulimia or binging     Denied Substance Use: no intoxication, withdrawal, tolerance, used in larger amounts or duration than intended, unsuccessful attempts to limit or quit, increased time engaging in or seeking out, cravings or strong desire to use, failure to fulfill obligations, negative consequences in social/interpersonal/occupational,/recreational areas, use in dangerous situations, or medical or psychological consequences       ROS  General ROS: negative  Ophthalmic ROS: negative  ENT ROS: negative  Allergy and Immunology ROS: negative  Hematological and Lymphatic ROS: negative  Endocrine ROS: negative  Respiratory ROS: no cough, shortness of breath, or wheezing  Cardiovascular ROS: no chest pain or dyspnea on exertion  Gastrointestinal ROS: no abdominal pain, change in bowel habits, or black or  "bloody stools  Genito-Urinary ROS: no dysuria, trouble voiding, or hematuria  Musculoskeletal ROS: negative  Neurological ROS: no TIA or stroke symptoms  Dermatological ROS: negative    PAST MEDICAL HISTORY   Past Medical History:   Diagnosis Date    ADHD (attention deficit hyperactivity disorder)     Bipolar disorder     History of psychiatric hospitalization     Hx of psychiatric care     Romi     Mood disorder     Psychiatric problem     Seizures     Therapy            PSYCHOTROPIC MEDICATIONS   Scheduled Meds:   divalproex  500 mg Oral BID    multivitamin  1 tablet Oral Daily    quetiapine  100 mg Oral QHS     Continuous Infusions:   PRN Meds:.acetaminophen, aluminum-magnesium hydroxide-simethicone, docusate sodium, hydrOXYzine pamoate, loperamide, nicotine, olanzapine **AND** olanzapine, pneumoc 13-donnell conj-dip cr(PF)        EXAMINATION    VITALS   Vitals:    05/27/17 2100   BP: 104/70   Pulse: 74   Resp: 16   Temp: 97 °F (36.1 °C)       CONSTITUTIONAL  General Appearance: stated age, casual dress    NEUROLOGIC  Abnormal Involuntary Movements: none noted  Gait and Station: normal gate    PSYCHIATRIC   Level of Consciousness: alert, awake  Orientation: grossly oriented  Grooming: appropriate for setting  Psychomotor Behavior: no PMR/PMA  Speech: mumbled at times  Mood: "anxious and good"  Affect: congruent, somewhat constricted  Thought Process: somewhat tangential  Thought Content: no SI/HI, no AH/VH  Memory: grossly intact but limited, likely by cognition  Attention: intact to conversation  Estimate if Intelligence:  Below to low-average based on work/education history, vocabulary and mental status exam  Insight: limited- limited understanding of anger issues and tx needs/plan  Judgment: fair- no bx issues, compliant and cooperative; reports of recent violnece        DIAGNOSTIC TESTING   Laboratory Results  No results found for this or any previous visit (from the past 24 hour(s)).      MEDICAL " DECISION MAKING    DIAGNOSES  Unspecified mood Disorder  Intermittent Explosive Disorder     Nicotine Dependence      Microcytic anemia  H/o seizure disorder    PROBLEM LIST AND MANAGEMENT PLANS  Mood  IED  Nicotine use  Anemia  H/o seizure disorder    PRESCRIPTION DRUG MANAGEMENT  Compliance: yes  Side Effects: no  Regimen Adjustments:   Depakote 500 mg po BID for mood stabilization, off-label IED, and seizure ppx, pending VPA lvl Monday AM  Seroquel to 100 po q HS for adjunctive mood disorder     Nicotine patch daily for nicotine cessation; patient counseled         DISCHARGE PLANNING  -SW to assist with aftercare planning and collateral  -Once stable, discharge to group home with outpatient follow up care and/or rehab  -Continue inpatient treatment under a PEC and/or CEC for danger to others as evidenced by HI in context of impulsivity, problems with mood, and suspected cognitive impairment.  Patient with limited insight and poor judgement. Not attending to ADL's.    NEED FOR CONTINUED HOSPITALIZATION  Psychiatric illness continues to pose a potential threat to life or bodily function, of self or others, thereby requiring the need for continued inpatient psychiatric hospitalization: Yes    Protective inpatient pyschiatric hospitalization required while a safe disposition plan is enacted: Yes    Patient stabilized and ready for discharge from inpatient psychiatric unit: No      STAFF:   Lupillo Angela MD  Psychiatry       Declines

## 2019-11-27 ENCOUNTER — HOSPITAL ENCOUNTER (EMERGENCY)
Facility: HOSPITAL | Age: 24
Discharge: PSYCHIATRIC OR SUBSTANCE USE FACILITY | End: 2019-11-28
Attending: SURGERY
Payer: MEDICARE

## 2019-11-27 DIAGNOSIS — R45.851 SUICIDAL IDEATION: Primary | ICD-10-CM

## 2019-11-27 DIAGNOSIS — R46.89 AGGRESSIVE BEHAVIOR: ICD-10-CM

## 2019-11-27 LAB
ALBUMIN SERPL BCP-MCNC: 3.8 G/DL (ref 3.5–5.2)
ALP SERPL-CCNC: 60 U/L (ref 55–135)
ALT SERPL W/O P-5'-P-CCNC: 14 U/L (ref 10–44)
AMORPH CRY URNS QL MICRO: NORMAL
AMPHET+METHAMPHET UR QL: NEGATIVE
ANION GAP SERPL CALC-SCNC: 9 MMOL/L (ref 8–16)
APAP SERPL-MCNC: <3 UG/ML (ref 10–20)
AST SERPL-CCNC: 19 U/L (ref 10–40)
BACTERIA #/AREA URNS HPF: NORMAL /HPF
BARBITURATES UR QL SCN>200 NG/ML: NEGATIVE
BASOPHILS # BLD AUTO: 0.02 K/UL (ref 0–0.2)
BASOPHILS NFR BLD: 0.3 % (ref 0–1.9)
BENZODIAZ UR QL SCN>200 NG/ML: NEGATIVE
BILIRUB SERPL-MCNC: 0.2 MG/DL (ref 0.1–1)
BILIRUB UR QL STRIP: ABNORMAL
BUN SERPL-MCNC: 7 MG/DL (ref 6–20)
BZE UR QL SCN: NEGATIVE
CALCIUM SERPL-MCNC: 9.3 MG/DL (ref 8.7–10.5)
CANNABINOIDS UR QL SCN: NEGATIVE
CHLORIDE SERPL-SCNC: 103 MMOL/L (ref 95–110)
CLARITY UR: CLEAR
CO2 SERPL-SCNC: 27 MMOL/L (ref 23–29)
COLOR UR: YELLOW
CREAT SERPL-MCNC: 1.1 MG/DL (ref 0.5–1.4)
CREAT UR-MCNC: >450 MG/DL (ref 23–375)
DIFFERENTIAL METHOD: ABNORMAL
EOSINOPHIL # BLD AUTO: 0.2 K/UL (ref 0–0.5)
EOSINOPHIL NFR BLD: 2.3 % (ref 0–8)
ERYTHROCYTE [DISTWIDTH] IN BLOOD BY AUTOMATED COUNT: 13.5 % (ref 11.5–14.5)
EST. GFR  (AFRICAN AMERICAN): >60 ML/MIN/1.73 M^2
EST. GFR  (NON AFRICAN AMERICAN): >60 ML/MIN/1.73 M^2
ETHANOL SERPL-MCNC: <10 MG/DL
GLUCOSE SERPL-MCNC: 99 MG/DL (ref 70–110)
GLUCOSE UR QL STRIP: NEGATIVE
HCT VFR BLD AUTO: 41.1 % (ref 40–54)
HGB BLD-MCNC: 13.4 G/DL (ref 14–18)
HGB UR QL STRIP: NEGATIVE
HYALINE CASTS #/AREA URNS LPF: 1 /LPF
IMM GRANULOCYTES # BLD AUTO: 0.02 K/UL (ref 0–0.04)
IMM GRANULOCYTES NFR BLD AUTO: 0.3 % (ref 0–0.5)
KETONES UR QL STRIP: ABNORMAL
LEUKOCYTE ESTERASE UR QL STRIP: NEGATIVE
LYMPHOCYTES # BLD AUTO: 1.2 K/UL (ref 1–4.8)
LYMPHOCYTES NFR BLD: 15.8 % (ref 18–48)
MCH RBC QN AUTO: 27.3 PG (ref 27–31)
MCHC RBC AUTO-ENTMCNC: 32.6 G/DL (ref 32–36)
MCV RBC AUTO: 84 FL (ref 82–98)
METHADONE UR QL SCN>300 NG/ML: NEGATIVE
MICROSCOPIC COMMENT: NORMAL
MONOCYTES # BLD AUTO: 0.8 K/UL (ref 0.3–1)
MONOCYTES NFR BLD: 10.1 % (ref 4–15)
NEUTROPHILS # BLD AUTO: 5.3 K/UL (ref 1.8–7.7)
NEUTROPHILS NFR BLD: 71.2 % (ref 38–73)
NITRITE UR QL STRIP: NEGATIVE
NRBC BLD-RTO: 0 /100 WBC
OPIATES UR QL SCN: NEGATIVE
PCP UR QL SCN>25 NG/ML: NEGATIVE
PH UR STRIP: 8 [PH] (ref 5–8)
PLATELET # BLD AUTO: 213 K/UL (ref 150–350)
PMV BLD AUTO: 10.5 FL (ref 9.2–12.9)
POTASSIUM SERPL-SCNC: 3.9 MMOL/L (ref 3.5–5.1)
PROT SERPL-MCNC: 7.7 G/DL (ref 6–8.4)
PROT UR QL STRIP: ABNORMAL
RBC # BLD AUTO: 4.91 M/UL (ref 4.6–6.2)
RBC #/AREA URNS HPF: 2 /HPF (ref 0–4)
SALICYLATES SERPL-MCNC: <5 MG/DL (ref 15–30)
SODIUM SERPL-SCNC: 139 MMOL/L (ref 136–145)
SP GR UR STRIP: 1.02 (ref 1–1.03)
SQUAMOUS #/AREA URNS HPF: 1 /HPF
TOXICOLOGY INFORMATION: ABNORMAL
TSH SERPL DL<=0.005 MIU/L-ACNC: 2.91 UIU/ML (ref 0.4–4)
URN SPEC COLLECT METH UR: ABNORMAL
UROBILINOGEN UR STRIP-ACNC: 1 EU/DL
WBC # BLD AUTO: 7.49 K/UL (ref 3.9–12.7)
WBC #/AREA URNS HPF: 4 /HPF (ref 0–5)

## 2019-11-27 PROCEDURE — 80320 DRUG SCREEN QUANTALCOHOLS: CPT

## 2019-11-27 PROCEDURE — 36415 COLL VENOUS BLD VENIPUNCTURE: CPT

## 2019-11-27 PROCEDURE — 80329 ANALGESICS NON-OPIOID 1 OR 2: CPT

## 2019-11-27 PROCEDURE — 85025 COMPLETE CBC W/AUTO DIFF WBC: CPT

## 2019-11-27 PROCEDURE — 80307 DRUG TEST PRSMV CHEM ANLYZR: CPT

## 2019-11-27 PROCEDURE — 84443 ASSAY THYROID STIM HORMONE: CPT

## 2019-11-27 PROCEDURE — 80053 COMPREHEN METABOLIC PANEL: CPT

## 2019-11-27 PROCEDURE — 81000 URINALYSIS NONAUTO W/SCOPE: CPT | Mod: 59

## 2019-11-27 PROCEDURE — 99285 EMERGENCY DEPT VISIT HI MDM: CPT

## 2019-11-27 RX ORDER — DIPHENHYDRAMINE HYDROCHLORIDE 50 MG/ML
50 INJECTION INTRAMUSCULAR; INTRAVENOUS EVERY 4 HOURS PRN
Status: DISCONTINUED | OUTPATIENT
Start: 2019-11-27 | End: 2019-11-28 | Stop reason: HOSPADM

## 2019-11-27 RX ORDER — LORAZEPAM 2 MG/ML
2 INJECTION INTRAMUSCULAR EVERY 4 HOURS PRN
Status: DISCONTINUED | OUTPATIENT
Start: 2019-11-27 | End: 2019-11-28 | Stop reason: HOSPADM

## 2019-11-27 RX ORDER — HALOPERIDOL 5 MG/ML
5 INJECTION INTRAMUSCULAR EVERY 4 HOURS PRN
Status: DISCONTINUED | OUTPATIENT
Start: 2019-11-27 | End: 2019-11-28 | Stop reason: HOSPADM

## 2019-11-28 VITALS
OXYGEN SATURATION: 99 % | DIASTOLIC BLOOD PRESSURE: 75 MMHG | BODY MASS INDEX: 41.78 KG/M2 | HEIGHT: 69 IN | WEIGHT: 282.06 LBS | HEART RATE: 98 BPM | TEMPERATURE: 98 F | RESPIRATION RATE: 16 BRPM | SYSTOLIC BLOOD PRESSURE: 142 MMHG

## 2019-11-28 NOTE — ED PROVIDER NOTES
Encounter Date: 11/27/2019       History     Chief Complaint   Patient presents with    Aggressive Behavior     Patient parent report aggressive behavior towards grandmother.      Patient is 24-year-old black male with history of explosive disorder and aggressive behavior.  He was committed for inpatient treatment a month ago with the same problem.  He returns today with aggressive behavior in the family situation, family unable to manage patient.  He has intermittently expressed thoughts of suicidal ideation.        Review of patient's allergies indicates:  No Known Allergies  Past Medical History:   Diagnosis Date    ADHD (attention deficit hyperactivity disorder)     Bipolar disorder     Depression     History of psychiatric hospitalization 2016    [facility]    History of psychiatric hospitalization 2017    5/26/17 [facility]    History of psychiatric hospitalization 2019    [facility]/8/19-[facility]     of psychiatric care     Seroquel, lexapro, and Depakote    Romi     Mood disorder     Psychiatric problem     Seizures     Therapy      History reviewed. No pertinent surgical history.  History reviewed. No pertinent family history.  Social History     Tobacco Use    Smoking status: Former Smoker     Packs/day: 1.00     Types: Cigarettes    Tobacco comment: handout given and reviewed with pt, will have consult for smoking cessation   Substance Use Topics    Alcohol use: No    Drug use: No     Review of Systems   Unable to perform ROS: Psychiatric disorder       Physical Exam     Initial Vitals [11/27/19 2047]   BP Pulse Resp Temp SpO2   (!) 173/82 100 20 98.2 °F (36.8 °C) 100 %      MAP       --         Physical Exam    Nursing note and vitals reviewed.  Constitutional: He appears well-developed and well-nourished.   HENT:   Head: Normocephalic and atraumatic.   Eyes: EOM are normal. Pupils are equal, round, and reactive to light.   Neck: Normal range of motion. Neck  supple.   Cardiovascular: Normal rate.   Pulmonary/Chest: Breath sounds normal. No respiratory distress. He has no wheezes.   Abdominal: Soft. He exhibits no distension. There is no tenderness. There is no rebound.   Musculoskeletal: Normal range of motion.   Neurological: He is alert and oriented to person, place, and time.   Skin: Skin is warm and dry.   Psychiatric:   Thoughts of suicidal ideation         ED Course   Procedures  Labs Reviewed   CBC W/ AUTO DIFFERENTIAL   COMPREHENSIVE METABOLIC PANEL   ALCOHOL,MEDICAL (ETHANOL)   SALICYLATE LEVEL   ACETAMINOPHEN LEVEL   URINALYSIS   DRUG SCREEN PANEL, URINE EMERGENCY          Imaging Results    None                                          Clinical Impression:       ICD-10-CM ICD-9-CM   1. Suicidal ideation R45.851 V62.84   2. Aggressive behavior R46.89 V40.39         Disposition:   Disposition: Transferred  Condition: Stable  Pt is medically cleared for transfer.                     Fiona Mobley MD  11/27/19 1975

## 2020-01-29 NOTE — PSYCH
Transportation arrangements have been made with  Medicaid. The patient will be taken to [facility], [address] [place] [address].  [id number]. The ride has been scheduled for 1:15p. [employer] has been notified. The expected  time is 1:30p.  The confirmation number is [id number].    negative - no chest pain

## 2020-03-02 PROBLEM — R46.89 AGGRESSION AGGRAVATED: Status: ACTIVE | Noted: 2020-03-02

## 2020-03-02 PROBLEM — R45.6 VIOLENT BEHAVIOR: Status: ACTIVE | Noted: 2020-03-02

## 2020-03-03 PROBLEM — D64.9 ANEMIA: Status: ACTIVE | Noted: 2017-05-26

## 2020-03-03 PROBLEM — Z13.9 ENCOUNTER FOR MEDICAL SCREENING EXAMINATION: Status: ACTIVE | Noted: 2020-03-03

## 2020-03-09 ENCOUNTER — HOSPITAL ENCOUNTER (EMERGENCY)
Facility: HOSPITAL | Age: 25
End: 2020-03-10
Attending: FAMILY MEDICINE
Payer: MEDICARE

## 2020-03-09 DIAGNOSIS — F69 BEHAVIOR PROBLEM, ADULT: Primary | ICD-10-CM

## 2020-03-09 LAB
ALBUMIN SERPL BCP-MCNC: 4.4 G/DL (ref 3.5–5.2)
ALP SERPL-CCNC: 49 U/L (ref 38–126)
ALT SERPL W/O P-5'-P-CCNC: 22 U/L (ref 10–44)
AMPHET+METHAMPHET UR QL: NEGATIVE
ANION GAP SERPL CALC-SCNC: 14 MMOL/L (ref 8–16)
APAP SERPL-MCNC: <10 UG/ML (ref 10–20)
AST SERPL-CCNC: 35 U/L (ref 15–46)
BACTERIA #/AREA URNS AUTO: ABNORMAL /HPF
BARBITURATES UR QL SCN>200 NG/ML: NEGATIVE
BASOPHILS # BLD AUTO: 0.01 K/UL (ref 0–0.2)
BASOPHILS NFR BLD: 0.1 % (ref 0–1.9)
BENZODIAZ UR QL SCN>200 NG/ML: NEGATIVE
BILIRUB SERPL-MCNC: 0.2 MG/DL (ref 0.1–1)
BILIRUB UR QL STRIP: NEGATIVE
BUN SERPL-MCNC: 9 MG/DL (ref 2–20)
BZE UR QL SCN: NEGATIVE
CALCIUM SERPL-MCNC: 9.4 MG/DL (ref 8.7–10.5)
CANNABINOIDS UR QL SCN: NEGATIVE
CAOX CRY UR QL COMP ASSIST: ABNORMAL
CHLORIDE SERPL-SCNC: 102 MMOL/L (ref 95–110)
CLARITY UR REFRACT.AUTO: CLEAR
CO2 SERPL-SCNC: 26 MMOL/L (ref 23–29)
COLOR UR AUTO: ABNORMAL
CREAT SERPL-MCNC: 0.94 MG/DL (ref 0.5–1.4)
CREAT UR-MCNC: >346.5 MG/DL (ref 23–375)
DIFFERENTIAL METHOD: ABNORMAL
EOSINOPHIL # BLD AUTO: 0.1 K/UL (ref 0–0.5)
EOSINOPHIL NFR BLD: 1.4 % (ref 0–8)
ERYTHROCYTE [DISTWIDTH] IN BLOOD BY AUTOMATED COUNT: 14.6 % (ref 11.5–14.5)
EST. GFR  (AFRICAN AMERICAN): >60 ML/MIN/1.73 M^2
EST. GFR  (NON AFRICAN AMERICAN): >60 ML/MIN/1.73 M^2
ETHANOL SERPL-MCNC: <10 MG/DL
GLUCOSE SERPL-MCNC: 133 MG/DL (ref 70–110)
GLUCOSE UR QL STRIP: ABNORMAL
HCT VFR BLD AUTO: 41.3 % (ref 40–54)
HGB BLD-MCNC: 12.9 G/DL (ref 14–18)
HGB UR QL STRIP: NEGATIVE
IMM GRANULOCYTES # BLD AUTO: 0.03 K/UL (ref 0–0.04)
IMM GRANULOCYTES NFR BLD AUTO: 0.4 % (ref 0–0.5)
KETONES UR QL STRIP: ABNORMAL
LEUKOCYTE ESTERASE UR QL STRIP: NEGATIVE
LYMPHOCYTES # BLD AUTO: 1.2 K/UL (ref 1–4.8)
LYMPHOCYTES NFR BLD: 15.9 % (ref 18–48)
MCH RBC QN AUTO: 26.3 PG (ref 27–31)
MCHC RBC AUTO-ENTMCNC: 31.2 G/DL (ref 32–36)
MCV RBC AUTO: 84 FL (ref 82–98)
METHADONE UR QL SCN>300 NG/ML: NEGATIVE
MICROSCOPIC COMMENT: ABNORMAL
MONOCYTES # BLD AUTO: 1 K/UL (ref 0.3–1)
MONOCYTES NFR BLD: 13.1 % (ref 4–15)
NEUTROPHILS # BLD AUTO: 5.3 K/UL (ref 1.8–7.7)
NEUTROPHILS NFR BLD: 69.1 % (ref 38–73)
NITRITE UR QL STRIP: NEGATIVE
NRBC BLD-RTO: 0 /100 WBC
OPIATES UR QL SCN: NEGATIVE
PCP UR QL SCN>25 NG/ML: NEGATIVE
PH UR STRIP: 6 [PH] (ref 5–8)
PLATELET # BLD AUTO: 233 K/UL (ref 150–350)
PMV BLD AUTO: 10.4 FL (ref 9.2–12.9)
POTASSIUM SERPL-SCNC: 3.6 MMOL/L (ref 3.5–5.1)
PROT SERPL-MCNC: 8.1 G/DL (ref 6–8.4)
PROT UR QL STRIP: ABNORMAL
RBC # BLD AUTO: 4.9 M/UL (ref 4.6–6.2)
SODIUM SERPL-SCNC: 142 MMOL/L (ref 136–145)
SP GR UR STRIP: 1.02 (ref 1–1.03)
TOXICOLOGY INFORMATION: NORMAL
URN SPEC COLLECT METH UR: ABNORMAL
UROBILINOGEN UR STRIP-ACNC: 1 EU/DL
WBC # BLD AUTO: 7.61 K/UL (ref 3.9–12.7)
YEAST UR QL AUTO: ABNORMAL

## 2020-03-09 PROCEDURE — 80053 COMPREHEN METABOLIC PANEL: CPT | Mod: ER

## 2020-03-09 PROCEDURE — 25000003 PHARM REV CODE 250: Mod: ER | Performed by: FAMILY MEDICINE

## 2020-03-09 PROCEDURE — 80329 ANALGESICS NON-OPIOID 1 OR 2: CPT | Mod: ER

## 2020-03-09 PROCEDURE — G0425 INPT/ED TELECONSULT30: HCPCS | Mod: 95,,, | Performed by: PSYCHIATRY & NEUROLOGY

## 2020-03-09 PROCEDURE — 99285 EMERGENCY DEPT VISIT HI MDM: CPT | Mod: ER

## 2020-03-09 PROCEDURE — 85025 COMPLETE CBC W/AUTO DIFF WBC: CPT | Mod: ER

## 2020-03-09 PROCEDURE — 80307 DRUG TEST PRSMV CHEM ANLYZR: CPT | Mod: ER

## 2020-03-09 PROCEDURE — 80320 DRUG SCREEN QUANTALCOHOLS: CPT | Mod: ER

## 2020-03-09 PROCEDURE — 81000 URINALYSIS NONAUTO W/SCOPE: CPT | Mod: ER,59

## 2020-03-09 PROCEDURE — G0425 PR INPT TELEHEALTH CONSULT 30M: ICD-10-PCS | Mod: 95,,, | Performed by: PSYCHIATRY & NEUROLOGY

## 2020-03-09 RX ORDER — FLUCONAZOLE 150 MG/1
150 TABLET ORAL
Status: COMPLETED | OUTPATIENT
Start: 2020-03-09 | End: 2020-03-09

## 2020-03-09 RX ADMIN — FLUCONAZOLE 150 MG: 150 TABLET ORAL at 05:03

## 2020-03-09 NOTE — ED NOTES
Pt changed into blue scrubs as per protocol. Pt calm and cooperative at this time, sitter at the bedside, will ctm.
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
## 2020-03-10 VITALS
HEIGHT: 69 IN | OXYGEN SATURATION: 100 % | TEMPERATURE: 99 F | SYSTOLIC BLOOD PRESSURE: 143 MMHG | HEART RATE: 86 BPM | BODY MASS INDEX: 42.21 KG/M2 | WEIGHT: 285 LBS | RESPIRATION RATE: 18 BRPM | DIASTOLIC BLOOD PRESSURE: 75 MMHG
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
## 2020-03-10 NOTE — ED NOTES
Pt in bed visiting with family. Stretcher low and locked with SR up.   Visualization of pt.  Meal provided of meatloaf dinner, powerade, and jello.  Food in disposable tray and one plastic fork.  Pt sitting up in bed eating meal prepared.   Continuous monitoring in progress.

## 2020-03-10 NOTE — ED PROVIDER NOTES
PROGRESS NOTE    **This is an assumption of care note**    Case accepted from Dr. العراقي at shift change, pending psychiatry consult.    Pt is brought into the ED under OPC for aggressive behavior to his mother, pt feels like he is not safe at home and others are not safe with him. Mother feels the same.       Dr Chen with Psychiatry has evaluated pt and believes that inpt Psyc treatment will be beneficial for this pt.     Patient is medically cleared for psychiatric evaluation. She has been placed under PEC, she will be placed in an appropriate psychiatric facility.     The encounter diagnosis was Behavior problem, adult.           Paul Ibarra MD  03/09/20 9832       Paul Ibarra MD  03/10/20 6755

## 2020-03-10 NOTE — ED NOTES
Aaox4. Denies discomfort. Patient is calm and answers appropriately. Denies needs. Mother at bedside.

## 2020-03-10 NOTE — ED NOTES
[facility] called and asked questions about patient and said they would call back after speaking with psychaitrist

## 2020-03-10 NOTE — ED NOTES
Patient report received from WALDEMAR Bloom. Patient resting and in no acute distress. Sitter remains at bedside. Will continue to monitor patient.

## 2020-03-10 NOTE — ED NOTES
Mother leaving for the night.   Request that we contact her when placement happens.  Claudia: [phone]

## 2020-03-10 NOTE — ED NOTES
Pt resting on stretcher with eyes closed. Resp even/unlabored. Pt remains calm. Pt in constant visual of sitter. Awaiting placement.

## 2020-03-10 NOTE — ED NOTES
Pt ate 100% of meal, tray and plastic fork removed.  Pt denies any further needs at this time.  Will continue to monitor

## 2020-06-08 PROBLEM — Z13.9 ENCOUNTER FOR MEDICAL SCREENING EXAMINATION: Status: RESOLVED | Noted: 2020-03-03 | Resolved: 2020-06-08
